# Patient Record
Sex: MALE | Race: WHITE | URBAN - METROPOLITAN AREA
[De-identification: names, ages, dates, MRNs, and addresses within clinical notes are randomized per-mention and may not be internally consistent; named-entity substitution may affect disease eponyms.]

---

## 2017-06-16 ENCOUNTER — TRANSFERRED RECORDS (OUTPATIENT)
Dept: HEALTH INFORMATION MANAGEMENT | Facility: CLINIC | Age: 22
End: 2017-06-16

## 2017-06-30 ENCOUNTER — TRANSFERRED RECORDS (OUTPATIENT)
Dept: HEALTH INFORMATION MANAGEMENT | Facility: CLINIC | Age: 22
End: 2017-06-30

## 2017-07-31 ENCOUNTER — ANESTHESIA (OUTPATIENT)
Dept: SURGERY | Facility: CLINIC | Age: 22
End: 2017-07-31
Payer: COMMERCIAL

## 2017-07-31 ENCOUNTER — HOSPITAL ENCOUNTER (OUTPATIENT)
Facility: CLINIC | Age: 22
Discharge: HOME | End: 2017-07-31
Attending: ORTHOPAEDIC SURGERY | Admitting: ORTHOPAEDIC SURGERY
Payer: COMMERCIAL

## 2017-07-31 ENCOUNTER — SURGERY (OUTPATIENT)
Age: 22
End: 2017-07-31

## 2017-07-31 ENCOUNTER — ANESTHESIA EVENT (OUTPATIENT)
Dept: SURGERY | Facility: CLINIC | Age: 22
End: 2017-07-31
Payer: COMMERCIAL

## 2017-07-31 ENCOUNTER — APPOINTMENT (OUTPATIENT)
Dept: GENERAL RADIOLOGY | Facility: CLINIC | Age: 22
End: 2017-07-31
Attending: ORTHOPAEDIC SURGERY
Payer: COMMERCIAL

## 2017-07-31 VITALS
OXYGEN SATURATION: 95 % | DIASTOLIC BLOOD PRESSURE: 78 MMHG | HEIGHT: 71 IN | SYSTOLIC BLOOD PRESSURE: 136 MMHG | BODY MASS INDEX: 40.18 KG/M2 | TEMPERATURE: 97.6 F | WEIGHT: 287 LBS | RESPIRATION RATE: 18 BRPM

## 2017-07-31 DIAGNOSIS — Z98.890 S/P DISCECTOMY: Primary | ICD-10-CM

## 2017-07-31 PROCEDURE — 71000013 ZZH RECOVERY PHASE 1 LEVEL 1 EA ADDTL HR: Performed by: ORTHOPAEDIC SURGERY

## 2017-07-31 PROCEDURE — 25000566 ZZH SEVOFLURANE, EA 15 MIN: Performed by: ORTHOPAEDIC SURGERY

## 2017-07-31 PROCEDURE — 25000128 H RX IP 250 OP 636: Performed by: ORTHOPAEDIC SURGERY

## 2017-07-31 PROCEDURE — 27210995 ZZH RX 272: Performed by: ORTHOPAEDIC SURGERY

## 2017-07-31 PROCEDURE — 25000132 ZZH RX MED GY IP 250 OP 250 PS 637: Performed by: ORTHOPAEDIC SURGERY

## 2017-07-31 PROCEDURE — 27210794 ZZH OR GENERAL SUPPLY STERILE: Performed by: ORTHOPAEDIC SURGERY

## 2017-07-31 PROCEDURE — 37000009 ZZH ANESTHESIA TECHNICAL FEE, EACH ADDTL 15 MIN: Performed by: ORTHOPAEDIC SURGERY

## 2017-07-31 PROCEDURE — 25000125 ZZHC RX 250

## 2017-07-31 PROCEDURE — 40000277 XR SURGERY CARM FLUORO LESS THAN 5 MIN W STILLS

## 2017-07-31 PROCEDURE — 25000125 ZZHC RX 250: Performed by: ORTHOPAEDIC SURGERY

## 2017-07-31 PROCEDURE — 25000128 H RX IP 250 OP 636

## 2017-07-31 PROCEDURE — 37000008 ZZH ANESTHESIA TECHNICAL FEE, 1ST 30 MIN: Performed by: ORTHOPAEDIC SURGERY

## 2017-07-31 PROCEDURE — 36000065 ZZH SURGERY LEVEL 4 W FLUORO 1ST 30 MIN: Performed by: ORTHOPAEDIC SURGERY

## 2017-07-31 PROCEDURE — 71000012 ZZH RECOVERY PHASE 1 LEVEL 1 FIRST HR: Performed by: ORTHOPAEDIC SURGERY

## 2017-07-31 PROCEDURE — 71000027 ZZH RECOVERY PHASE 2 EACH 15 MINS: Performed by: ORTHOPAEDIC SURGERY

## 2017-07-31 PROCEDURE — 25000128 H RX IP 250 OP 636: Performed by: ANESTHESIOLOGY

## 2017-07-31 PROCEDURE — 36000063 ZZH SURGERY LEVEL 4 EA 15 ADDTL MIN: Performed by: ORTHOPAEDIC SURGERY

## 2017-07-31 PROCEDURE — 40000170 ZZH STATISTIC PRE-PROCEDURE ASSESSMENT II: Performed by: ORTHOPAEDIC SURGERY

## 2017-07-31 RX ORDER — OXYCODONE HYDROCHLORIDE 5 MG/1
5 TABLET ORAL ONCE
Status: COMPLETED | OUTPATIENT
Start: 2017-07-31 | End: 2017-07-31

## 2017-07-31 RX ORDER — NEOSTIGMINE METHYLSULFATE 1 MG/ML
VIAL (ML) INJECTION PRN
Status: DISCONTINUED | OUTPATIENT
Start: 2017-07-31 | End: 2017-07-31

## 2017-07-31 RX ORDER — SODIUM CHLORIDE, SODIUM LACTATE, POTASSIUM CHLORIDE, CALCIUM CHLORIDE 600; 310; 30; 20 MG/100ML; MG/100ML; MG/100ML; MG/100ML
INJECTION, SOLUTION INTRAVENOUS CONTINUOUS
Status: DISCONTINUED | OUTPATIENT
Start: 2017-07-31 | End: 2017-07-31 | Stop reason: HOSPADM

## 2017-07-31 RX ORDER — ONDANSETRON 2 MG/ML
4 INJECTION INTRAMUSCULAR; INTRAVENOUS EVERY 30 MIN PRN
Status: DISCONTINUED | OUTPATIENT
Start: 2017-07-31 | End: 2017-07-31 | Stop reason: HOSPADM

## 2017-07-31 RX ORDER — SODIUM CHLORIDE, SODIUM LACTATE, POTASSIUM CHLORIDE, CALCIUM CHLORIDE 600; 310; 30; 20 MG/100ML; MG/100ML; MG/100ML; MG/100ML
INJECTION, SOLUTION INTRAVENOUS CONTINUOUS PRN
Status: DISCONTINUED | OUTPATIENT
Start: 2017-07-31 | End: 2017-07-31

## 2017-07-31 RX ORDER — ONDANSETRON 2 MG/ML
INJECTION INTRAMUSCULAR; INTRAVENOUS PRN
Status: DISCONTINUED | OUTPATIENT
Start: 2017-07-31 | End: 2017-07-31

## 2017-07-31 RX ORDER — CEFAZOLIN SODIUM 1 G/3ML
1 INJECTION, POWDER, FOR SOLUTION INTRAMUSCULAR; INTRAVENOUS SEE ADMIN INSTRUCTIONS
Status: DISCONTINUED | OUTPATIENT
Start: 2017-07-31 | End: 2017-07-31 | Stop reason: HOSPADM

## 2017-07-31 RX ORDER — KETOROLAC TROMETHAMINE 30 MG/ML
30 INJECTION, SOLUTION INTRAMUSCULAR; INTRAVENOUS ONCE
Status: COMPLETED | OUTPATIENT
Start: 2017-07-31 | End: 2017-07-31

## 2017-07-31 RX ORDER — CEFAZOLIN SODIUM 2 G/100ML
2 INJECTION, SOLUTION INTRAVENOUS
Status: DISCONTINUED | OUTPATIENT
Start: 2017-07-31 | End: 2017-07-31 | Stop reason: DRUGHIGH

## 2017-07-31 RX ORDER — CEFAZOLIN SODIUM 1 G/50ML
3 SOLUTION INTRAVENOUS
Status: COMPLETED | OUTPATIENT
Start: 2017-07-31 | End: 2017-07-31

## 2017-07-31 RX ORDER — FENTANYL CITRATE 0.05 MG/ML
25-50 INJECTION, SOLUTION INTRAMUSCULAR; INTRAVENOUS
Status: DISCONTINUED | OUTPATIENT
Start: 2017-07-31 | End: 2017-07-31 | Stop reason: HOSPADM

## 2017-07-31 RX ORDER — OXYCODONE HYDROCHLORIDE 5 MG/1
5 TABLET ORAL EVERY 4 HOURS PRN
Qty: 40 TABLET | Refills: 0 | Status: SHIPPED | OUTPATIENT
Start: 2017-07-31

## 2017-07-31 RX ORDER — DEXAMETHASONE SODIUM PHOSPHATE 4 MG/ML
INJECTION, SOLUTION INTRA-ARTICULAR; INTRALESIONAL; INTRAMUSCULAR; INTRAVENOUS; SOFT TISSUE PRN
Status: DISCONTINUED | OUTPATIENT
Start: 2017-07-31 | End: 2017-07-31

## 2017-07-31 RX ORDER — VECURONIUM BROMIDE 1 MG/ML
INJECTION, POWDER, LYOPHILIZED, FOR SOLUTION INTRAVENOUS PRN
Status: DISCONTINUED | OUTPATIENT
Start: 2017-07-31 | End: 2017-07-31

## 2017-07-31 RX ORDER — ONDANSETRON 4 MG/1
4 TABLET, ORALLY DISINTEGRATING ORAL EVERY 30 MIN PRN
Status: DISCONTINUED | OUTPATIENT
Start: 2017-07-31 | End: 2017-07-31 | Stop reason: HOSPADM

## 2017-07-31 RX ORDER — PROPOFOL 10 MG/ML
INJECTION, EMULSION INTRAVENOUS PRN
Status: DISCONTINUED | OUTPATIENT
Start: 2017-07-31 | End: 2017-07-31

## 2017-07-31 RX ORDER — PROPOFOL 10 MG/ML
INJECTION, EMULSION INTRAVENOUS CONTINUOUS PRN
Status: DISCONTINUED | OUTPATIENT
Start: 2017-07-31 | End: 2017-07-31

## 2017-07-31 RX ORDER — BETAMETHASONE SODIUM PHOSPHATE AND BETAMETHASONE ACETATE 3; 3 MG/ML; MG/ML
INJECTION, SUSPENSION INTRA-ARTICULAR; INTRALESIONAL; INTRAMUSCULAR; SOFT TISSUE PRN
Status: DISCONTINUED | OUTPATIENT
Start: 2017-07-31 | End: 2017-07-31 | Stop reason: HOSPADM

## 2017-07-31 RX ORDER — GLYCOPYRROLATE 0.2 MG/ML
INJECTION, SOLUTION INTRAMUSCULAR; INTRAVENOUS PRN
Status: DISCONTINUED | OUTPATIENT
Start: 2017-07-31 | End: 2017-07-31

## 2017-07-31 RX ORDER — LIDOCAINE HYDROCHLORIDE 20 MG/ML
INJECTION, SOLUTION INFILTRATION; PERINEURAL PRN
Status: DISCONTINUED | OUTPATIENT
Start: 2017-07-31 | End: 2017-07-31

## 2017-07-31 RX ORDER — FENTANYL CITRATE 50 UG/ML
INJECTION, SOLUTION INTRAMUSCULAR; INTRAVENOUS PRN
Status: DISCONTINUED | OUTPATIENT
Start: 2017-07-31 | End: 2017-07-31

## 2017-07-31 RX ORDER — MEPERIDINE HYDROCHLORIDE 25 MG/ML
12.5 INJECTION INTRAMUSCULAR; INTRAVENOUS; SUBCUTANEOUS EVERY 5 MIN PRN
Status: DISCONTINUED | OUTPATIENT
Start: 2017-07-31 | End: 2017-07-31 | Stop reason: HOSPADM

## 2017-07-31 RX ADMIN — DEXMEDETOMIDINE HYDROCHLORIDE 12 MCG: 100 INJECTION, SOLUTION INTRAVENOUS at 07:53

## 2017-07-31 RX ADMIN — FENTANYL CITRATE 50 MCG: 50 INJECTION, SOLUTION INTRAMUSCULAR; INTRAVENOUS at 11:29

## 2017-07-31 RX ADMIN — HYDROMORPHONE HYDROCHLORIDE 0.5 MG: 1 INJECTION, SOLUTION INTRAMUSCULAR; INTRAVENOUS; SUBCUTANEOUS at 11:38

## 2017-07-31 RX ADMIN — SODIUM CHLORIDE, POTASSIUM CHLORIDE, SODIUM LACTATE AND CALCIUM CHLORIDE: 600; 310; 30; 20 INJECTION, SOLUTION INTRAVENOUS at 09:46

## 2017-07-31 RX ADMIN — ONDANSETRON 4 MG: 2 INJECTION INTRAMUSCULAR; INTRAVENOUS at 09:20

## 2017-07-31 RX ADMIN — VECURONIUM BROMIDE 2 MG: 1 INJECTION, POWDER, LYOPHILIZED, FOR SOLUTION INTRAVENOUS at 08:00

## 2017-07-31 RX ADMIN — LIDOCAINE HYDROCHLORIDE 100 MG: 20 INJECTION, SOLUTION INFILTRATION; PERINEURAL at 07:35

## 2017-07-31 RX ADMIN — OXYCODONE HYDROCHLORIDE 5 MG: 5 TABLET ORAL at 12:10

## 2017-07-31 RX ADMIN — MIDAZOLAM HYDROCHLORIDE 2 MG: 1 INJECTION, SOLUTION INTRAMUSCULAR; INTRAVENOUS at 07:29

## 2017-07-31 RX ADMIN — BETAMETHASONE SODIUM PHOSPHATE AND BETAMETHASONE ACETATE 6 MG: 3; 3 INJECTION, SUSPENSION INTRA-ARTICULAR; INTRALESIONAL; INTRAMUSCULAR at 09:09

## 2017-07-31 RX ADMIN — DEXAMETHASONE SODIUM PHOSPHATE 4 MG: 4 INJECTION, SOLUTION INTRA-ARTICULAR; INTRALESIONAL; INTRAMUSCULAR; INTRAVENOUS; SOFT TISSUE at 07:55

## 2017-07-31 RX ADMIN — BUPIVACAINE HYDROCHLORIDE AND EPINEPHRINE BITARTRATE 59 ML: 5; .005 INJECTION, SOLUTION PERINEURAL at 09:04

## 2017-07-31 RX ADMIN — NEOSTIGMINE METHYLSULFATE 5 MG: 1 INJECTION INTRAMUSCULAR; INTRAVENOUS; SUBCUTANEOUS at 09:30

## 2017-07-31 RX ADMIN — PROCHLORPERAZINE EDISYLATE 5 MG: 5 INJECTION INTRAMUSCULAR; INTRAVENOUS at 14:06

## 2017-07-31 RX ADMIN — SODIUM CHLORIDE, POTASSIUM CHLORIDE, SODIUM LACTATE AND CALCIUM CHLORIDE: 600; 310; 30; 20 INJECTION, SOLUTION INTRAVENOUS at 14:04

## 2017-07-31 RX ADMIN — PROPOFOL 200 MCG/KG/MIN: 10 INJECTION, EMULSION INTRAVENOUS at 07:45

## 2017-07-31 RX ADMIN — ONDANSETRON 4 MG: 2 INJECTION INTRAMUSCULAR; INTRAVENOUS at 12:51

## 2017-07-31 RX ADMIN — VECURONIUM BROMIDE 1 MG: 1 INJECTION, POWDER, LYOPHILIZED, FOR SOLUTION INTRAVENOUS at 09:01

## 2017-07-31 RX ADMIN — GLYCOPYRROLATE 0.8 MG: 0.2 INJECTION, SOLUTION INTRAMUSCULAR; INTRAVENOUS at 09:30

## 2017-07-31 RX ADMIN — PROPOFOL 30 MG: 10 INJECTION, EMULSION INTRAVENOUS at 07:36

## 2017-07-31 RX ADMIN — FENTANYL CITRATE 100 MCG: 50 INJECTION, SOLUTION INTRAMUSCULAR; INTRAVENOUS at 07:35

## 2017-07-31 RX ADMIN — GENTAMICIN SULFATE 1000 ML: 40 INJECTION, SOLUTION INTRAMUSCULAR; INTRAVENOUS at 08:14

## 2017-07-31 RX ADMIN — FENTANYL CITRATE 50 MCG: 50 INJECTION, SOLUTION INTRAMUSCULAR; INTRAVENOUS at 11:11

## 2017-07-31 RX ADMIN — MIDAZOLAM HYDROCHLORIDE 1 MG: 1 INJECTION, SOLUTION INTRAMUSCULAR; INTRAVENOUS at 07:35

## 2017-07-31 RX ADMIN — SODIUM CHLORIDE, POTASSIUM CHLORIDE, SODIUM LACTATE AND CALCIUM CHLORIDE: 600; 310; 30; 20 INJECTION, SOLUTION INTRAVENOUS at 07:28

## 2017-07-31 RX ADMIN — Medication 2 G: at 07:45

## 2017-07-31 RX ADMIN — PROPOFOL 200 MG: 10 INJECTION, EMULSION INTRAVENOUS at 07:35

## 2017-07-31 RX ADMIN — THROMBIN, TOPICAL (BOVINE) 5000 UNITS: KIT at 08:14

## 2017-07-31 RX ADMIN — DEXMEDETOMIDINE HYDROCHLORIDE 8 MCG: 100 INJECTION, SOLUTION INTRAVENOUS at 09:09

## 2017-07-31 RX ADMIN — KETOROLAC TROMETHAMINE 30 MG: 30 INJECTION, SOLUTION INTRAMUSCULAR at 10:37

## 2017-07-31 RX ADMIN — ROCURONIUM BROMIDE 50 MG: 10 INJECTION INTRAVENOUS at 07:35

## 2017-07-31 RX ADMIN — VECURONIUM BROMIDE 1 MG: 1 INJECTION, POWDER, LYOPHILIZED, FOR SOLUTION INTRAVENOUS at 08:30

## 2017-07-31 RX ADMIN — FENTANYL CITRATE 50 MCG: 50 INJECTION, SOLUTION INTRAMUSCULAR; INTRAVENOUS at 08:03

## 2017-07-31 NOTE — OR NURSING
Pt had reported some tingling in left leg while in PACU.  Pt states this was present pre op as well.   Upon getting pt up to progress to phase II.  Pt says he has some numbness in left leg.  Call placed to Dr. Blake.  Dr. Blake in surgery, will see pt when he comes out.

## 2017-07-31 NOTE — ANESTHESIA CARE TRANSFER NOTE
Patient: Fran Hernandez    Procedure(s):  LEFT L4-5 DISCECTOMY - Wound Class: I-Clean    Diagnosis: marge central l4-5 disc herneation, left leg radiculopathy, status post previous l4-s1 discectomy   Diagnosis Additional Information: No value filed.    Anesthesia Type:   General, ETT     Note:  Airway :Face Mask  Patient transferred to:PACU  Comments: Pt awake and able to verbalize needs. ALL monitors on and audible. Vital signs stable. Spontaneous respiration without difficulty. o2 sats 100% on 10Lo2 per face mask. Pt denies pain. Report given to PACU RN.      Vitals: (Last set prior to Anesthesia Care Transfer)    CRNA VITALS  7/31/2017 0933 - 7/31/2017 1015      7/31/2017             NIBP: 148/76    Pulse: 80                Electronically Signed By: SAMMY Schmitz CRNA  July 31, 2017  10:15 AM

## 2017-07-31 NOTE — IP AVS SNAPSHOT
MRN:[id number]                      After Visit Summary   7/31/2017    Fran Hernandez    MRN: [id number]           Thank you!     Thank you for choosing [facility] for your care. Our goal is always to provide you with excellent care. Hearing back from our patients is one way we can continue to improve our services. Please take a few minutes to complete the written survey that you may receive in the mail after you visit with us. Thank you!        Patient Information     Date Of Birth          1995        About your hospital stay     You were admitted on:  July 31, 2017 You last received care in the:  [facility] PACU    You were discharged on:  July 31, 2017       Who to Call     For medical emergencies, please call 911.  For non-urgent questions about your medical care, please call your primary care provider or clinic, None  For questions related to your surgery, please call your surgery clinic        Attending Provider     Provider Lex Ramirez MD Orthopedics       Primary Care Provider    None      After Care Instructions     Activity       Your activity upon discharge: follow Dr. Blake d/c instruction sheet            Discharge Instructions           Supplies       List the supplies the pt needs to go home  4x4s and tape                  Further instructions from your care team         While you were at the hospital today you received Toradol, an antiinflammatory medication similar to Ibuprofen.  You should not take other antiinflammatory medication, such as Ibuprofen, Motrin, Advil, Aleve, Naprosyn, etc, until 440pm.       Same Day Surgery Discharge Instructions for  Sedation and General Anesthesia       It's not unusual to feel dizzy, light-headed or faint for up to 24 hours after surgery or while taking pain medication.  If you have these symptoms: sit for a few minutes before standing and have someone assist you when you get up to walk or use the  bathroom.      You should rest and relax for the next 24 hours. We recommend you make arrangements to have an adult stay with you for at least 24 hours after your discharge.  Avoid hazardous and strenuous activity.      DO NOT DRIVE any vehicle or operate mechanical equipment for 24 hours following the end of your surgery.  Even though you may feel normal, your reactions may be affected by the medication you have received.      Do not drink alcoholic beverages for 24 hours following surgery.       Slowly progress to your regular diet as you feel able. It's not unusual to feel nauseated and/or vomit after receiving anesthesia.  If you develop these symptoms, drink clear liquids (apple juice, ginger ale, broth, 7-up, etc. ) until you feel better.  If your nausea and vomiting persists for 24 hours, please notify your surgeon.        All narcotic pain medications, along with inactivity and anesthesia, can cause constipation. Drinking plenty of liquids and increasing fiber intake will help.      For any questions of a medical nature, call your surgeon.      Do not make important decisions for 24 hours.      If you had general anesthesia, you may have a sore throat for a couple of days related to the breathing tube used during surgery.  You may use Cepacol lozenges to help with this discomfort.  If it worsens or if you develop a fever, contact your surgeon.       If you feel your pain is not well managed with the pain medications prescribed by your surgeon, please contact your surgeon's office to let them know so they can address your concerns.       [facility]   Post-Operative Laminectomy/Discectomy Instructions  Dr. Lex Blake    As you are getting ready to leave the hospital following your surgery, you will have many questions regarding your follow-up and after hospital care.  Dr. Blake will talk to you about many of these questions in the hospital but it is also important for you to have your  instructions written down so that you can refer to them when you get home.  These are general guidelines that apply to patients that had your type of surgery.      If you were not given a post-operative follow up appointment then please call Dr. Blake's office at  [phone] for a follow-up appointment for 10-14 days after the date of your surgery.      After surgery you may notice some continued leg or back pain similar to the symptoms you had prior to surgery.  This is normal and usually is related to the amount of pressure and the length of time that the nerves were pinched.  Sometimes the pain that you felt in your leg is replaced with some degree of numbness that gradually fades over a period of days or weeks.      Certain postures and activities can cause an increase in back or leg pain and may even put you at risk for recurrence.  Avoid any twisting or bending.  Do not lift anything that weighs more than 15 pounds.  When picking things up from the floor, be sure to bend at the knees.  When moving about, it is best to try to keep your back straight and well supported.      Try to restrict your sitting to a minimum for the first 1-2 days for periods less than 20-30 min at a time as sitting places an increased load on the intervertebral discs. Gradually increase sitting, as comfort allows over the first week post-op.  When you do sit, try to use a chair that provides adequate support for your back.  When riding in a car, you may want to recline the seat to lessen the load on the intervertebral disc.      Walking is an excellent post-operative exercise.  You are encouraged to walk several times per day for at least five to ten minutes at a time.  Walking for longer periods is all right if you do not develop a pain in either your back or legs.  Let your comfort be your guide in helping you set limits.  Do not participate in any sports activities.      You may shower on the third day after your surgery, if your  incision is clean and dry.  You may shower earlier if you have a plastic dressing (Tegaderm) to cover your incision.  Do not soak in a tub until you are seen in the office for your follow-up appointment with Dr. Blake.      The small pieces of tape over your incision are called Steri-Strips. They can be removed if they become lose; other-wise leave steri-strips on for 14 days.  Your stitches are the kind that dissolves under the skin.  They do not have to be removed.      Occasionally, non-absorbable sutures (black nylon) need to be used.  This is not covered by Steri-Strips.  These sutures should be covered with a waterproof barrier when showering (Tegaderm or foam tape), and will be removed in the office at your follow-up appointment.      If you go home the day of surgery, you should put on a fresh dressing the following day.  If you stay overnight in the hospital, the nurse will check your incision and change the dressing before you are discharged.  Change your dressing daily for 10-14 days.        If you had compression stockings (TEDS) on in the hospital you do not need to wear these after you are discharged from the hospital.      You should not drive until you are no longer taking narcotic pain medication.      After your surgery, you may begin to engage in sexual activity as comfort allows.  For the first four weeks it is recommended that you participate as a passive partner.      Before returning to work, please consult Dr. Blake regarding work limitations or restrictions. Your ability to return to work will depend on the type of work that you do and the type of surgery you had.  Please let Dr. Blake know if you need any written information for your employer.      Infection following disc surgery is rare.  Signs of infection include: drainage from your incision, increasing redness at the margins of your wound, fevers (greater than 101   Fahrenheit), chills, rapidly increasing back pain.  If you have any of  "these symptoms, please call the office and either Dr. Blake or one of the nurses can discuss this with you.  Other reasons to call the office would include difficulty passing your urine or trouble controlling your bowels.      If you have any questions or concerns not covered in these instructions, please feel free to call Dr. Blake's office at [phone].  Someone is always available to answer your questions.                     Pending Results     Date and Time Order Name Status Description    2017 0951 XR Surgery SHELIA Fluoro L/T 5 Min w Stills In process             Admission Information     Date & Time Provider Department Dept. Phone    2017 Lex Blake MD [facility] PACU [phone]      Your Vitals Were     Blood Pressure Temperature Respirations Height Weight Pulse Oximetry    162/86 97.6  F (36.4  C) (Temporal) 16 1.791 m (5' 10.5\") 130.2 kg (287 lb) 93%    BMI (Body Mass Index)                   40.6 kg/m2           [employer] Information     [email or web address] lets you send messages to your doctor, view your test results, renew your prescriptions, schedule appointments and more. To sign up, go to [email or web address] . Click on \"Log in\" on the left side of the screen, which will take you to the Welcome page. Then click on \"Sign up Now\" on the right side of the page.     You will be asked to enter the access code listed below, as well as some personal information. Please follow the directions to create your username and password.     Your access code is: [email or web address]  Expires: 10/29/2017 11:16 AM     Your access code will  in 90 days. If you need help or a new code, please call your [place] clinic or [phone].        Care EveryWhere ID     This is your Care EveryWhere ID. This could be used by other organizations to access your [place] medical records  [id number]        Equal Access to Services     KM ORTIZ AH: Lenny Mar, junaid layton, melissa " sunita mackenzie avilagavino ana daveyaan ah. [facility] [phone].    ATENCIÓN: Si sai butts, tiene a ventura disposición servicios gratuitos de asistencia lingüística. Llchel[place] al [phone].    We comply with applicable federal civil rights laws and [place] laws. We do not discriminate on the basis of race, color, national origin, age, disability sex, sexual orientation or gender identity.               Review of your medicines      START taking        Dose / Directions    oxyCODONE 5 MG IR tablet   Commonly known as:  ROXICODONE   Used for:  S/P discectomy   Notes to Patient:  One tab taken at 12:10        Dose:  5 mg   Take 1 tablet (5 mg) by mouth every 4 hours as needed for pain   Quantity:  40 tablet   Refills:  0         CONTINUE these medicines which have NOT CHANGED        Dose / Directions    SERTRALINE HCL PO        Dose:  50 mg   Take 50 mg by mouth daily   Refills:  0       TYLENOL PO        Dose:  325 mg   Take 325 mg by mouth every 6 hours as needed for mild pain or fever   Refills:  0            Where to get your medicines      Some of these will need a paper prescription and others can be bought over the counter. Ask your nurse if you have questions.     Bring a paper prescription for each of these medications     oxyCODONE 5 MG IR tablet                Protect others around you: Learn how to safely use, store and throw away your medicines at www.disposemymeds.org.             Medication List: This is a list of all your medications and when to take them. Check marks below indicate your daily home schedule. Keep this list as a reference.      Medications           Morning Afternoon Evening Bedtime As Needed    oxyCODONE 5 MG IR tablet   Commonly known as:  ROXICODONE   Take 1 tablet (5 mg) by mouth every 4 hours as needed for pain   Last time this was given:  5 mg on 7/31/2017 12:10 PM   Notes to Patient:  One tab taken at 12:10                                SERTRALINE HCL PO    Take 50 mg by mouth daily                                TYLENOL PO   Take 325 mg by mouth every 6 hours as needed for mild pain or fever

## 2017-07-31 NOTE — IP AVS SNAPSHOT
[place] [address]    [place] [address]    Phone:  [phone]                                       After Visit Summary   7/31/2017    Fran Hernandez    MRN: [id number]           After Visit Summary Signature Page     I have received my discharge instructions, and my questions have been answered. I have discussed any challenges I see with this plan with the nurse or doctor.    ..........................................................................................................................................  Patient/Patient Representative Signature      ..........................................................................................................................................  Patient Representative Print Name and Relationship to Patient    ..................................................               ................................................  Date                                            Time    ..........................................................................................................................................  Reviewed by Signature/Title    ...................................................              ..............................................  Date                                                            Time

## 2017-07-31 NOTE — ANESTHESIA POSTPROCEDURE EVALUATION
Patient: Fran David    Procedure(s):  LEFT L4-5 DISCECTOMY - Wound Class: I-Clean    Diagnosis:marge central l4-5 disc herneation, left leg radiculopathy, status post previous l4-s1 discectomy   Diagnosis Additional Information: No value filed.    Anesthesia Type:  General, ETT    Note:  Anesthesia Post Evaluation    Patient location during evaluation: bedside  Patient participation: Able to fully participate in evaluation  Level of consciousness: awake  Pain management: adequate  Airway patency: patent  Cardiovascular status: acceptable  Respiratory status: acceptable  Hydration status: acceptable  PONV: none     Anesthetic complications: None    Comments: No anesthetic complications noted.         Last vitals:  Vitals:    07/31/17 1150 07/31/17 1200 07/31/17 1215   BP:  162/86 173/84   Resp: 11 16 19   Temp:      SpO2: 95% 93% 94%         Electronically Signed By: Mark Mcdonough DO, DO  July 31, 2017  1:18 PM

## 2017-07-31 NOTE — OP NOTE
[facility]  Spinal Surgery Operative Report    Pre-operative diagnosis:  Large left and central L4 5 disc herniation with left leg radiculopathy    Post-operative diagnosis: Same   Procedure:  left L4 5 discectomy   Surgeon: VERONICA HODGE MD   Assistant(s): JACOBY FIGUEROA PA-C   Anesthesia: General endotracheal anesthesia   Estimated blood loss: 20 ml   Total IV fluids: (See anesthesia record)   Blood transfusion: NONE   Drains: None   Specimens: NONE   Implants: AS ABOVE   Findings: AS ABOVE   Complications: NONE   Condition: STABLE       Operative Description:     The patient was brought to the operating room.  General anesthetic was administered.   He was positioned prone on the Blevins frame.  He was carefully padded and positioned, his back was marked with a marking pen over the planned skin incision.  He had had a previous L5-S1 discectomy.  That old scar was marked as well.  His back was then prepped and draped in a routine sterile fashion.    A marking needle was placed at what was felt to be the L4 5 interspace.  A lateral x-ray was obtained.  This confirmed that it was the L4 5 interspace.  The planned skin incision was then injected with 10 cc of 0.5% Marcaine with epinephrine.  After the timeout midline skin incision was made over the L4 5 interspace.  A left-sided exposure was performed.  A clamp was placed on the spinous process of L4.  Lateral x-ray was obtained.  This confirmed that it was the spinous process of L4.  This was marked with a rongeur.  The left L4 5 interspace was exposed.  A Walton retractor was placed.  A partial spinous process removal of L4 was required to get access to the L4 5 interspace.  Ligamentum flavum was removed.  A laminotomy of L4 was required as well as L5.  Head and narrow interlaminar space and the narrow interfacet distance.    I was able to get the traversing L5 nerve root exposed.  Because of the size of the disc herniation it was a little difficult to  mobilize the nerve initially.  He had some epidural veins that were also bleeding and work coagulated with bipolar cautery.  I was unable to mobilize the nerve medially.  My assistant held the nerve retracted with G Rico retractor.  As I mobilized the nerve medially I could see the thin inflammatory covering.  As was entered with a curette.  Disc material extruded under pressure.  I then used straight and up-biting [place] pituitaries to remove the disc material.  Eventually I used a regular sized pituitary as well.  He had a large hole in the annulus.  The disc material itself was soft.  When I could not retrieve any additional material the wound was irrigated with antibiotic solution.  Additional Marcaine was used in the skin fascia and muscle.    The disc was checked one final time.  There were no additional loose fragments.  Celestone and Gelfoam placed over the nerve root.  The wound was closed in routine fashion with interrupted #1 Vicryl suture for the deep fascia, 2-0 Vicryl for the subcutaneous and 3-0 Vicryl for the skin.    Dressings were applied, drapes removed, he was transferred back to the hospital cart, taken recovery room in good condition.  Estimated blood loss was 20 cc, complications none, drains none

## 2017-07-31 NOTE — DISCHARGE INSTRUCTIONS
While you were at the hospital today you received Toradol, an antiinflammatory medication similar to Ibuprofen.  You should not take other antiinflammatory medication, such as Ibuprofen, Motrin, Advil, Aleve, Naprosyn, etc, until 440pm.       Same Day Surgery Discharge Instructions for  Sedation and General Anesthesia       It's not unusual to feel dizzy, light-headed or faint for up to 24 hours after surgery or while taking pain medication.  If you have these symptoms: sit for a few minutes before standing and have someone assist you when you get up to walk or use the bathroom.      You should rest and relax for the next 24 hours. We recommend you make arrangements to have an adult stay with you for at least 24 hours after your discharge.  Avoid hazardous and strenuous activity.      DO NOT DRIVE any vehicle or operate mechanical equipment for 24 hours following the end of your surgery.  Even though you may feel normal, your reactions may be affected by the medication you have received.      Do not drink alcoholic beverages for 24 hours following surgery.       Slowly progress to your regular diet as you feel able. It's not unusual to feel nauseated and/or vomit after receiving anesthesia.  If you develop these symptoms, drink clear liquids (apple juice, ginger ale, broth, 7-up, etc. ) until you feel better.  If your nausea and vomiting persists for 24 hours, please notify your surgeon.        All narcotic pain medications, along with inactivity and anesthesia, can cause constipation. Drinking plenty of liquids and increasing fiber intake will help.      For any questions of a medical nature, call your surgeon.      Do not make important decisions for 24 hours.      If you had general anesthesia, you may have a sore throat for a couple of days related to the breathing tube used during surgery.  You may use Cepacol lozenges to help with this discomfort.  If it worsens or if you develop a fever, contact your  surgeon.       If you feel your pain is not well managed with the pain medications prescribed by your surgeon, please contact your surgeon's office to let them know so they can address your concerns.       [facility]   Post-Operative Laminectomy/Discectomy Instructions  Dr. Lex Blake    As you are getting ready to leave the hospital following your surgery, you will have many questions regarding your follow-up and after hospital care.  Dr. Blake will talk to you about many of these questions in the hospital but it is also important for you to have your instructions written down so that you can refer to them when you get home.  These are general guidelines that apply to patients that had your type of surgery.      If you were not given a post-operative follow up appointment then please call Dr. Blake's office at  [phone] for a follow-up appointment for 10-14 days after the date of your surgery.      After surgery you may notice some continued leg or back pain similar to the symptoms you had prior to surgery.  This is normal and usually is related to the amount of pressure and the length of time that the nerves were pinched.  Sometimes the pain that you felt in your leg is replaced with some degree of numbness that gradually fades over a period of days or weeks.      Certain postures and activities can cause an increase in back or leg pain and may even put you at risk for recurrence.  Avoid any twisting or bending.  Do not lift anything that weighs more than 15 pounds.  When picking things up from the floor, be sure to bend at the knees.  When moving about, it is best to try to keep your back straight and well supported.      Try to restrict your sitting to a minimum for the first 1-2 days for periods less than 20-30 min at a time as sitting places an increased load on the intervertebral discs. Gradually increase sitting, as comfort allows over the first week post-op.  When you do sit, try  to use a chair that provides adequate support for your back.  When riding in a car, you may want to recline the seat to lessen the load on the intervertebral disc.      Walking is an excellent post-operative exercise.  You are encouraged to walk several times per day for at least five to ten minutes at a time.  Walking for longer periods is all right if you do not develop a pain in either your back or legs.  Let your comfort be your guide in helping you set limits.  Do not participate in any sports activities.      You may shower on the third day after your surgery, if your incision is clean and dry.  You may shower earlier if you have a plastic dressing (Tegaderm) to cover your incision.  Do not soak in a tub until you are seen in the office for your follow-up appointment with Dr. Blake.      The small pieces of tape over your incision are called Steri-Strips. They can be removed if they become lose; other-wise leave steri-strips on for 14 days.  Your stitches are the kind that dissolves under the skin.  They do not have to be removed.      Occasionally, non-absorbable sutures (black nylon) need to be used.  This is not covered by Steri-Strips.  These sutures should be covered with a waterproof barrier when showering (Tegaderm or foam tape), and will be removed in the office at your follow-up appointment.      If you go home the day of surgery, you should put on a fresh dressing the following day.  If you stay overnight in the hospital, the nurse will check your incision and change the dressing before you are discharged.  Change your dressing daily for 10-14 days.        If you had compression stockings (TEDS) on in the hospital you do not need to wear these after you are discharged from the hospital.      You should not drive until you are no longer taking narcotic pain medication.      After your surgery, you may begin to engage in sexual activity as comfort allows.  For the first four weeks it is recommended  that you participate as a passive partner.      Before returning to work, please consult Dr. Blake regarding work limitations or restrictions. Your ability to return to work will depend on the type of work that you do and the type of surgery you had.  Please let Dr. Blake know if you need any written information for your employer.      Infection following disc surgery is rare.  Signs of infection include: drainage from your incision, increasing redness at the margins of your wound, fevers (greater than 101   Fahrenheit), chills, rapidly increasing back pain.  If you have any of these symptoms, please call the office and either Dr. Blake or one of the nurses can discuss this with you.  Other reasons to call the office would include difficulty passing your urine or trouble controlling your bowels.      If you have any questions or concerns not covered in these instructions, please feel free to call Dr. Blake's office at [phone].  Someone is always available to answer your questions.

## 2017-07-31 NOTE — ANESTHESIA PREPROCEDURE EVALUATION
Anesthesia Evaluation     .             ROS/MED HX    ENT/Pulmonary:  - neg pulmonary ROS    (-) sleep apnea   Neurologic:     (+)other neuro L4-L5 disc herniation with radiculopathy    Cardiovascular:         METS/Exercise Tolerance:     Hematologic:  - neg hematologic  ROS       Musculoskeletal:  - neg musculoskeletal ROS       GI/Hepatic:  - neg GI/hepatic ROS      (-) GERD   Renal/Genitourinary:  - ROS Renal section negative       Endo:  - neg endo ROS       Psychiatric:     (+) psychiatric history anxiety and depression      Infectious Disease:  - neg infectious disease ROS       Malignancy:      - no malignancy   Other:                     Physical Exam  Normal systems: cardiovascular, pulmonary and dental    Airway   Mallampati: II  TM distance: >3 FB  Neck ROM: full    Dental     Cardiovascular       Pulmonary                     Anesthesia Plan      History & Physical Review  History and physical reviewed and following examination; no interval change.    ASA Status:  2 .    NPO Status:  > 8 hours    Plan for General and ETT with Intravenous induction. Maintenance will be Balanced.    PONV prophylaxis:  Ondansetron (or other 5HT-3) and Dexamethasone or Solumedrol  Additional equipment: Videolaryngoscope      Postoperative Care  Postoperative pain management:  IV analgesics.      Consents  Anesthetic plan, risks, benefits and alternatives discussed with:  Patient..            Procedure: Procedure(s):  LAMINECTOMY LUMBAR ONE LEVEL  Preop diagnosis: caroline central l4-5 disc herneation, left leg radiculopathy, status post previous l4-s1 discectomy     No Known Allergies  Past Medical History:   Diagnosis Date     Anxiety      Depression      Lumbar radiculopathy      Past Surgical History:   Procedure Laterality Date     BACK SURGERY       LUMBAR BACK SURGERY       Prior to Admission medications    Medication Sig Start Date End Date Taking? Authorizing Provider   SERTRALINE HCL PO Take 50 mg by mouth daily    Yes Reported, Patient     Current Facility-Administered Medications Ordered in Epic   Medication Dose Route Frequency Last Rate Last Dose     ceFAZolin (ANCEF) 1 g vial to attach to  ml bag for ADULT or 50 ml bag for PEDS  1 g Intravenous See Admin Instructions         ceFAZolin (ANCEF) intermittent infusion 3 g (pre-mix)  3 g Intravenous Pre-Op/Pre-procedure x 1 dose         No current [facility]-ordered outpatient prescriptions on file.     Wt Readings from Last 1 Encounters:   07/31/17 130.2 kg (287 lb)     Temp Readings from Last 1 Encounters:   07/31/17 36.9  C (98.4  F) (Tympanic)     BP Readings from Last 6 Encounters:   No data found for BP     Pulse Readings from Last 4 Encounters:   No data found for Pulse     Resp Readings from Last 1 Encounters:   No data found for Resp     SpO2 Readings from Last 1 Encounters:   No data found for SpO2     No results for input(s): NA, POTASSIUM, CHLORIDE, CO2, ANIONGAP, GLC, BUN, CR, JAROD in the last 64359 hours.  No results for input(s): WBC, HGB, PLT in the last 25498 hours.  No results for input(s): INR in the last 81440 hours.    Invalid input(s): APTT   RECENT LABS:   ECG:   ECHO:   CXR:                  .

## 2017-07-31 NOTE — OR NURSING
States that nausea improved after Compazine. Patient assisted to sitting, then upright. Transferred into wheelchair without difficulty. Brought to discharge door, discharged to home.

## 2017-08-18 ENCOUNTER — TRANSFERRED RECORDS (OUTPATIENT)
Dept: HEALTH INFORMATION MANAGEMENT | Facility: CLINIC | Age: 22
End: 2017-08-18

## 2017-08-20 ENCOUNTER — HOSPITAL ENCOUNTER (INPATIENT)
Facility: CLINIC | Age: 22
LOS: 1 days | Discharge: HOME | DRG: 520 | End: 2017-08-22
Attending: ORTHOPAEDIC SURGERY | Admitting: ORTHOPAEDIC SURGERY
Payer: COMMERCIAL

## 2017-08-20 ENCOUNTER — TRANSFERRED RECORDS (OUTPATIENT)
Dept: HEALTH INFORMATION MANAGEMENT | Facility: CLINIC | Age: 22
End: 2017-08-20

## 2017-08-20 ENCOUNTER — HOSPITAL ENCOUNTER (OUTPATIENT)
Facility: CLINIC | Age: 22
End: 2017-08-20
Attending: ORTHOPAEDIC SURGERY | Admitting: ORTHOPAEDIC SURGERY
Payer: COMMERCIAL

## 2017-08-20 DIAGNOSIS — Z98.890 STATUS POST LUMBAR DISCECTOMY: Primary | ICD-10-CM

## 2017-08-20 PROBLEM — M51.26 LUMBAR DISC HERNIATION: Status: ACTIVE | Noted: 2017-08-20

## 2017-08-20 PROCEDURE — G0378 HOSPITAL OBSERVATION PER HR: HCPCS

## 2017-08-20 RX ORDER — NALOXONE HYDROCHLORIDE 0.4 MG/ML
.1-.4 INJECTION, SOLUTION INTRAMUSCULAR; INTRAVENOUS; SUBCUTANEOUS
Status: DISCONTINUED | OUTPATIENT
Start: 2017-08-20 | End: 2017-08-21

## 2017-08-20 RX ORDER — CEFAZOLIN SODIUM 1 G/3ML
1 INJECTION, POWDER, FOR SOLUTION INTRAMUSCULAR; INTRAVENOUS SEE ADMIN INSTRUCTIONS
Status: DISCONTINUED | OUTPATIENT
Start: 2017-08-20 | End: 2017-08-21 | Stop reason: HOSPADM

## 2017-08-20 RX ORDER — CEFAZOLIN SODIUM 1 G/50ML
3 SOLUTION INTRAVENOUS
Status: COMPLETED | OUTPATIENT
Start: 2017-08-20 | End: 2017-08-21

## 2017-08-20 RX ORDER — DIAZEPAM 5 MG
5 TABLET ORAL EVERY 6 HOURS PRN
Status: DISCONTINUED | OUTPATIENT
Start: 2017-08-20 | End: 2017-08-21 | Stop reason: HOSPADM

## 2017-08-20 NOTE — IP AVS SNAPSHOT
MRN:[id number]                      After Visit Summary   8/20/2017    Fran Hernandez    MRN: [id number]           Thank you!     Thank you for choosing [facility] for your care. Our goal is always to provide you with excellent care. Hearing back from our patients is one way we can continue to improve our services. Please take a few minutes to complete the written survey that you may receive in the mail after you visit with us. Thank you!        Patient Information     Date Of Birth          1995        Designated Caregiver       Most Recent Value    Caregiver    Will someone help with your care after discharge? yes      About your hospital stay     You were admitted on:  August 20, 2017 You last received care in the:  [facility] [address] [facility]    You were discharged on:  August 22, 2017       Who to Call     For medical emergencies, please call 911.  For non-urgent questions about your medical care, please call your primary care provider or clinic, None  For questions related to your surgery, please call your surgery clinic        Attending Provider     Provider Lex Ramirez MD Orthopedics       Primary Care Provider    None      Follow-up Appointments     Follow-up and recommended labs and tests        Give patient discharge instruction sheet, dressing supplies.  Patient has sutures that need to be removed.  He needs to follow up with me in two weeks for suture removal.  He needs to keep his incision clean and dry.  He should cover the incision with 4 x 4 and Tegaderm for showering.                  Further instructions from your care team                 Care after a Discectomy/Decompression - Dr. Lex Blake    The following information will help you through your recovery at home.  Pain    It is normal for you to experience some pain in the area of your incision after your surgery.  Some of your leg pain may go away immediately after your surgery.  Some of  the pain will gradually decrease over the next few days or weeks depending on the amount of inflammation present in the nerve.      Sometimes Dr. Blake will place a steroid preparation on the nerve during surgery.   This may help decrease the nerve inflammation for the first few days after surgery.  If some of your leg pain returns 3 to 5 days after surgery it may be due to the steroid wearing off.  The pain should not be as bad as your pre-op pain and will diminish over a period of weeks.      You should call Dr. Blake s office if your leg pain returns suddenly and does not improve over 24 hours.    Activity    You may increase your activity as tolerated; walking is the best form of exercise after spine surgery.      Avoid bending, lifting, and twisting (BLT).  Avoid activities such as vacuuming, raking and shoveling.    Try to limit your lifting to 10-15lbs during the first 2 weeks and then increase to 20-25lbs over the next 6 weeks.    You may return to work approximately 1- 2 weeks after your surgery, if you have a sedentary or desk type job.  If you have a physical job Dr. Blake and his staff will help you determine a return to work plan.      You may resume sexual activity when you feel ready.  Stop if you have pain.    Driving    You may drive if you feel strong enough and are not taking any narcotic pain medications.    Incision Site     The first 3 days after surgery Dr. Blake would like you to keep your incision dry.   You may take a sponge bath during this time or cover your dressing with a transparent material called Tegaderm (sold at most pharmacies).      The first 3 days after surgery you should change your gauze dressing at least once a day.  After 3 days you may remove the gauze dressing and leave it off, at this point you may shower without covering your incision, allow water and soap to run over your incision but do not scrub the area or soak in a tub.    Your incision is covered with steri-strips  (narrow white tapes); they will get loose and fall of in 7-10 days.  If they do not fall off after 10 days you may remove them or Dr. Hayden pérez staff will remove them at your post-op visit.    Most patients have dissolvable stitches which do not need to be removed.  In some cases nylon stitches are used and they need to be removed, 10-14 days after surgery.  If you have staples or nylon sutures please call for a removal appointment with Dr. Hayden pérez nurse.    Pain Management     Take your prescribed pain medication as needed and directed.  Use Tylenol and Ibuprofen for your discomfort when you no longer need the narcotic pain medication.      If you need a refill on your pain medication call [phone], please allow 24 hours for your prescription to be refilled, Dr. Hayden pérez office does not refill pain medications on Friday.   Diet     Eat a healthy diet; this will help your recovery.    Drink plenty of fluids, water, milk or juice.    If you have trouble with constipation you should eat more fiber, drink more fluids, increase your walking or try an over the counter laxative.    Follow-up Visits    You should have a post-op appointment that was scheduled for you at the same time your surgery was scheduled. If you do not, please call Dr. Hayden pérez office when you get home from your surgery.    Write down any questions you have about your surgery, recovery, return to work and other topics you wished to be covered at your post-op visit.  This way, we will be able to address all of your questions at your next visit.    Call Dr. Hayden pérez office if you have any questions or concerns.    When to Call your Doctor:    If you have any redness, warmth or swelling at the incision site.    If your incision opens up.    If you have increasing drainage from your incision.    If you have a temperature greater than 100.5 degrees Fahrenheit.    [facility] [address] [address]  Phone: [phone]  Fax: [phone]  Web site:  [email or web address]    Follow up with Dr. Blake in 2-3 weeks.     Pending Results     Date and Time Order Name Status Description    2017 1302 Anaerobic bacterial culture In process     2017 1301 Wound Culture Aerobic Bacterial Preliminary             Admission Information     Date & Time Provider Department Dept. Phone    2017 Lex Blake MD [place] [address] [facility] [phone]      Your Vitals Were     Blood Pressure Pulse Temperature Respirations Weight Pulse Oximetry    149/86 (BP Location: Left arm) 72 97.8  F (36.6  C) (Oral) 16 130 kg (286 lb 9.6 oz) 95%    BMI (Body Mass Index)                   40.54 kg/m2           MyChart Information     [employer] lets you send messages to your doctor, view your test results, renew your prescriptions, schedule appointments and more. To sign up, go to [email or web address] . Click on \"Log in\" on the left side of the screen, which will take you to the Welcome page. Then click on \"Sign up Now\" on the right side of the page.     You will be asked to enter the access code listed below, as well as some personal information. Please follow the directions to create your username and password.     Your access code is: [email or web address]  Expires: 10/29/2017 11:16 AM     Your access code will  in 90 days. If you need help or a new code, please call your [facility] clinic or [phone].        Care EveryWhere ID     This is your Care EveryWhere ID. This could be used by other organizations to access your [facility] medical records  [id number]        Equal Access to Services     [facility]: Hadii aad linda hadasho Sodimaali, waaxda luqadaha, qaybta kaalmada samiregyada, sunita wolf. So [facility] [phone].    ATENCIÓN: Si habla español, tiene a ventura disposición servicios gratuitos de asistencia lingüística. Llame al [phone].    We comply with applicable federal civil rights laws and [place] laws. We do not discriminate on " the basis of race, color, national origin, age, disability sex, sexual orientation or gender identity.               Review of your medicines      CONTINUE these medicines which may have CHANGED, or have new prescriptions. If we are uncertain of the size of tablets/capsules you have at home, strength may be listed as something that might have changed.        Dose / Directions    * oxyCODONE 5 MG IR tablet   Commonly known as:  ROXICODONE   This may have changed:  Another medication with the same name was added. Make sure you understand how and when to take each.   Used for:  S/P discectomy   Notes to Patient:  Next available at 2PM        Dose:  5 mg   Take 1 tablet (5 mg) by mouth every 4 hours as needed for pain   Quantity:  40 tablet   Refills:  0       * oxyCODONE 5 MG IR tablet   Commonly known as:  ROXICODONE   This may have changed:  You were already taking a medication with the same name, and this prescription was added. Make sure you understand how and when to take each.   Used for:  Status post lumbar discectomy        Dose:  5-10 mg   Take 1-2 tablets (5-10 mg) by mouth every 4 hours as needed for moderate to severe pain   Quantity:  20 tablet   Refills:  0       * Notice:  This list has 2 medication(s) that are the same as other medications prescribed for you. Read the directions carefully, and ask your doctor or other care provider to review them with you.      CONTINUE these medicines which have NOT CHANGED        Dose / Directions    PREDNISONE PO        Take 60 mg daily x 3 days, 40 mg daily x 3 days, then 30 mg daily x 3 days, then 20 mg daily x 3 days, then 10 mg daily x 3 days   Refills:  0            Where to get your medicines      Some of these will need a paper prescription and others can be bought over the counter. Ask your nurse if you have questions.     Bring a paper prescription for each of these medications     oxyCODONE 5 MG IR tablet                Protect others around you: Learn how  to safely use, store and throw away your medicines at www.disposemymeds.org.             Medication List: This is a list of all your medications and when to take them. Check marks below indicate your daily home schedule. Keep this list as a reference.      Medications           Morning Afternoon Evening Bedtime As Needed    * oxyCODONE 5 MG IR tablet   Commonly known as:  ROXICODONE   Take 1 tablet (5 mg) by mouth every 4 hours as needed for pain   Last time this was given:  5 mg on 8/22/2017 10:01 AM   Notes to Patient:  Next available at 2PM                                   * oxyCODONE 5 MG IR tablet   Commonly known as:  ROXICODONE   Take 1-2 tablets (5-10 mg) by mouth every 4 hours as needed for moderate to severe pain   Last time this was given:  5 mg on 8/22/2017 10:01 AM                                PREDNISONE PO   Take 60 mg daily x 3 days, 40 mg daily x 3 days, then 30 mg daily x 3 days, then 20 mg daily x 3 days, then 10 mg daily x 3 days                                * Notice:  This list has 2 medication(s) that are the same as other medications prescribed for you. Read the directions carefully, and ask your doctor or other care provider to review them with you.              More Information        Patient Education    Oxycodone Hydrochloride Oral capsule    Oxycodone Hydrochloride Oral solution    Oxycodone Hydrochloride Oral tablet    Oxycodone Hydrochloride Oral tablet [Abuse Deterrent]    Oxycodone Hydrochloride Oral tablet, extended-release  Oxycodone Hydrochloride Oral tablet  What is this medicine?  OXYCODONE (ox i KOE done) is a pain reliever. It is used to treat moderate to severe pain.  This medicine may be used for other purposes; ask your health care provider or pharmacist if you have questions.  What should I tell my health care provider before I take this medicine?  They need to know if you have any of these conditions:    Parthenon's disease    brain tumor    drug abuse or  addiction    head injury    heart disease    if you frequently drink alcohol containing drinks    kidney disease or problems going to the bathroom    liver disease    lung disease, asthma, or breathing problems    mental problems    an unusual or allergic reaction to oxycodone, codeine, hydrocodone, morphine, other medicines, foods, dyes, or preservatives    pregnant or trying to get pregnant    breast-feeding  How should I use this medicine?  Take this medicine by mouth with a glass of water. Follow the directions on the prescription label. You can take it with or without food. If it upsets your stomach, take it with food. Take your medicine at regular intervals. Do not take it more often than directed. Do not stop taking except on your doctor's advice.  Some brands of this medicine, like Oxecta, have special instructions. Ask your doctor or pharmacist if these directions are for you: Do not cut, crush or chew this medicine. Swallow only one tablet at a time. Do not wet, soak, or lick the tablet before you take it.  Talk to your pediatrician regarding the use of this medicine in children. Special care may be needed.  Overdosage: If you think you have taken too much of this medicine contact a poison control center or emergency room at once.  NOTE: This medicine is only for you. Do not share this medicine with others.  What if I miss a dose?  If you miss a dose, take it as soon as you can. If it is almost time for your next dose, take only that dose. Do not take double or extra doses.  What may interact with this medicine?    alcohol    antihistamines    certain medicines used for nausea like chlorpromazine, droperidol    erythromycin    ketoconazole    medicines for depression, anxiety, or psychotic disturbances    medicines for sleep    muscle relaxants    naloxone    naltrexone    narcotic medicines (opiates) for pain    nilotinib    phenobarbital    phenytoin    rifampin    ritonavir    voriconazole  This list  may not describe all possible interactions. Give your health care provider a list of all the medicines, herbs, non-prescription drugs, or dietary supplements you use. Also tell them if you smoke, drink alcohol, or use illegal drugs. Some items may interact with your medicine.  What should I watch for while using this medicine?  Tell your doctor or health care professional if your pain does not go away, if it gets worse, or if you have new or a different type of pain. You may develop tolerance to the medicine. Tolerance means that you will need a higher dose of the medicine for pain relief. Tolerance is normal and is expected if you take this medicine for a long time.  Do not suddenly stop taking your medicine because you may develop a severe reaction. Your body becomes used to the medicine. This does NOT mean you are addicted. Addiction is a behavior related to getting and using a drug for a non-medical reason. If you have pain, you have a medical reason to take pain medicine. Your doctor will tell you how much medicine to take. If your doctor wants you to stop the medicine, the dose will be slowly lowered over time to avoid any side effects.  You may get drowsy or dizzy when you first start taking this medicine or change doses. Do not drive, use machinery, or do anything that may be dangerous until you know how the medicine affects you. Stand or sit up slowly.  There are different types of narcotic medicines (opiates) for pain. If you take more than one type at the same time, you may have more side effects. Give your health care provider a list of all medicines you use. Your doctor will tell you how much medicine to take. Do not take more medicine than directed. Call emergency for help if you have problems breathing.  This medicine will cause constipation. Try to have a bowel movement at least every 2 to 3 days. If you do not have a bowel movement for 3 days, call your doctor or health care professional.  Your  mouth may get dry. Drinking water, chewing sugarless gum, or sucking on hard candy may help. See your dentist every 6 months.  What side effects may I notice from receiving this medicine?  Side effects that you should report to your doctor or health care professional as soon as possible:    allergic reactions like skin rash, itching or hives, swelling of the face, lips, or tongue    breathing problems    confusion    feeling faint or lightheaded, falls    trouble passing urine or change in the amount of urine    unusually weak or tired  Side effects that usually do not require medical attention (report to your doctor or health care professional if they continue or are bothersome):    constipation    dry mouth    itching    nausea, vomiting    upset stomach  This list may not describe all possible side effects. Call your doctor for medical advice about side effects. You may report side effects to FDA at 2-334-FDA-8394.  Where should I keep my medicine?  Keep out of the reach of children. This medicine can be abused. Keep your medicine in a safe place to protect it from theft. Do not share this medicine with anyone. Selling or giving away this medicine is dangerous and against the law.  Store at room temperature between 15 and 30 degrees C (59 and 86 degrees F). Protect from light. Keep container tightly closed.  This medicine may cause accidental overdose and death if it is taken by other adults, children, or pets. Flush any unused medicine down the toilet to reduce the chance of harm. Do not use the medicine after the expiration date.  NOTE: This sheet is a summary. It may not cover all possible information. If you have questions about this medicine, talk to your doctor, pharmacist, or health care provider.  NOTE:This sheet is a summary. It may not cover all possible information. If you have questions about this medicine, talk to your doctor, pharmacist, or health care provider. Copyright  2016 [employer]

## 2017-08-20 NOTE — IP AVS SNAPSHOT
[address] [facility]    [address]    [place] [address]    Phone:  [phone]                                       After Visit Summary   8/20/2017    Fran Hernandez    MRN: [id number]           After Visit Summary Signature Page     I have received my discharge instructions, and my questions have been answered. I have discussed any challenges I see with this plan with the nurse or doctor.    ..........................................................................................................................................  Patient/Patient Representative Signature      ..........................................................................................................................................  Patient Representative Print Name and Relationship to Patient    ..................................................               ................................................  Date                                            Time    ..........................................................................................................................................  Reviewed by Signature/Title    ...................................................              ..............................................  Date                                                            Time

## 2017-08-21 ENCOUNTER — ANESTHESIA (OUTPATIENT)
Dept: SURGERY | Facility: CLINIC | Age: 22
DRG: 520 | End: 2017-08-21
Payer: COMMERCIAL

## 2017-08-21 ENCOUNTER — ANESTHESIA EVENT (OUTPATIENT)
Dept: SURGERY | Facility: CLINIC | Age: 22
DRG: 520 | End: 2017-08-21
Payer: COMMERCIAL

## 2017-08-21 PROBLEM — M51.26 RECURRENT HERNIATION OF LUMBAR DISC: Status: ACTIVE | Noted: 2017-08-21

## 2017-08-21 LAB
BACTERIA SPEC CULT: NORMAL
HGB BLD-MCNC: 16.8 G/DL (ref 13.3–17.7)
SPECIMEN SOURCE: NORMAL

## 2017-08-21 PROCEDURE — 36000065 ZZH SURGERY LEVEL 4 W FLUORO 1ST 30 MIN: Performed by: ORTHOPAEDIC SURGERY

## 2017-08-21 PROCEDURE — 87070 CULTURE OTHR SPECIMN AEROBIC: CPT | Performed by: ORTHOPAEDIC SURGERY

## 2017-08-21 PROCEDURE — 36415 COLL VENOUS BLD VENIPUNCTURE: CPT | Performed by: ANESTHESIOLOGY

## 2017-08-21 PROCEDURE — 0SB20ZZ EXCISION OF LUMBAR VERTEBRAL DISC, OPEN APPROACH: ICD-10-PCS | Performed by: ORTHOPAEDIC SURGERY

## 2017-08-21 PROCEDURE — 27210995 ZZH RX 272: Performed by: ORTHOPAEDIC SURGERY

## 2017-08-21 PROCEDURE — 85018 HEMOGLOBIN: CPT | Performed by: ANESTHESIOLOGY

## 2017-08-21 PROCEDURE — 25000128 H RX IP 250 OP 636: Performed by: ORTHOPAEDIC SURGERY

## 2017-08-21 PROCEDURE — 25000128 H RX IP 250 OP 636: Performed by: NURSE ANESTHETIST, CERTIFIED REGISTERED

## 2017-08-21 PROCEDURE — 87075 CULTR BACTERIA EXCEPT BLOOD: CPT | Performed by: ORTHOPAEDIC SURGERY

## 2017-08-21 PROCEDURE — 37000009 ZZH ANESTHESIA TECHNICAL FEE, EACH ADDTL 15 MIN: Performed by: ORTHOPAEDIC SURGERY

## 2017-08-21 PROCEDURE — 25000125 ZZHC RX 250: Performed by: ORTHOPAEDIC SURGERY

## 2017-08-21 PROCEDURE — 96376 TX/PRO/DX INJ SAME DRUG ADON: CPT

## 2017-08-21 PROCEDURE — G0378 HOSPITAL OBSERVATION PER HR: HCPCS

## 2017-08-21 PROCEDURE — 25000125 ZZHC RX 250: Performed by: NURSE ANESTHETIST, CERTIFIED REGISTERED

## 2017-08-21 PROCEDURE — 27210794 ZZH OR GENERAL SUPPLY STERILE: Performed by: ORTHOPAEDIC SURGERY

## 2017-08-21 PROCEDURE — 25000132 ZZH RX MED GY IP 250 OP 250 PS 637: Performed by: ORTHOPAEDIC SURGERY

## 2017-08-21 PROCEDURE — 71000012 ZZH RECOVERY PHASE 1 LEVEL 1 FIRST HR: Performed by: ORTHOPAEDIC SURGERY

## 2017-08-21 PROCEDURE — 40000170 ZZH STATISTIC PRE-PROCEDURE ASSESSMENT II: Performed by: ORTHOPAEDIC SURGERY

## 2017-08-21 PROCEDURE — 37000008 ZZH ANESTHESIA TECHNICAL FEE, 1ST 30 MIN: Performed by: ORTHOPAEDIC SURGERY

## 2017-08-21 PROCEDURE — 96374 THER/PROPH/DIAG INJ IV PUSH: CPT

## 2017-08-21 PROCEDURE — 25000128 H RX IP 250 OP 636: Performed by: ANESTHESIOLOGY

## 2017-08-21 PROCEDURE — 36000063 ZZH SURGERY LEVEL 4 EA 15 ADDTL MIN: Performed by: ORTHOPAEDIC SURGERY

## 2017-08-21 PROCEDURE — 25000566 ZZH SEVOFLURANE, EA 15 MIN: Performed by: ORTHOPAEDIC SURGERY

## 2017-08-21 PROCEDURE — 12000000 ZZH R&B MED SURG/OB

## 2017-08-21 RX ORDER — SODIUM CHLORIDE 9 MG/ML
INJECTION, SOLUTION INTRAVENOUS CONTINUOUS
Status: DISCONTINUED | OUTPATIENT
Start: 2017-08-21 | End: 2017-08-22 | Stop reason: HOSPADM

## 2017-08-21 RX ORDER — FENTANYL CITRATE 50 UG/ML
25-50 INJECTION, SOLUTION INTRAMUSCULAR; INTRAVENOUS
Status: DISCONTINUED | OUTPATIENT
Start: 2017-08-21 | End: 2017-08-21 | Stop reason: HOSPADM

## 2017-08-21 RX ORDER — SODIUM CHLORIDE, SODIUM LACTATE, POTASSIUM CHLORIDE, CALCIUM CHLORIDE 600; 310; 30; 20 MG/100ML; MG/100ML; MG/100ML; MG/100ML
INJECTION, SOLUTION INTRAVENOUS CONTINUOUS
Status: DISCONTINUED | OUTPATIENT
Start: 2017-08-21 | End: 2017-08-21 | Stop reason: HOSPADM

## 2017-08-21 RX ORDER — LIDOCAINE HYDROCHLORIDE 20 MG/ML
INJECTION, SOLUTION INFILTRATION; PERINEURAL PRN
Status: DISCONTINUED | OUTPATIENT
Start: 2017-08-21 | End: 2017-08-21

## 2017-08-21 RX ORDER — ONDANSETRON 2 MG/ML
4 INJECTION INTRAMUSCULAR; INTRAVENOUS EVERY 30 MIN PRN
Status: DISCONTINUED | OUTPATIENT
Start: 2017-08-21 | End: 2017-08-21 | Stop reason: HOSPADM

## 2017-08-21 RX ORDER — LIDOCAINE 40 MG/G
CREAM TOPICAL
Status: DISCONTINUED | OUTPATIENT
Start: 2017-08-21 | End: 2017-08-22 | Stop reason: HOSPADM

## 2017-08-21 RX ORDER — NALOXONE HYDROCHLORIDE 0.4 MG/ML
.1-.4 INJECTION, SOLUTION INTRAMUSCULAR; INTRAVENOUS; SUBCUTANEOUS
Status: DISCONTINUED | OUTPATIENT
Start: 2017-08-21 | End: 2017-08-22 | Stop reason: HOSPADM

## 2017-08-21 RX ORDER — BETAMETHASONE SODIUM PHOSPHATE AND BETAMETHASONE ACETATE 3; 3 MG/ML; MG/ML
INJECTION, SUSPENSION INTRA-ARTICULAR; INTRALESIONAL; INTRAMUSCULAR; SOFT TISSUE PRN
Status: DISCONTINUED | OUTPATIENT
Start: 2017-08-21 | End: 2017-08-21 | Stop reason: HOSPADM

## 2017-08-21 RX ORDER — ONDANSETRON 4 MG/1
4 TABLET, ORALLY DISINTEGRATING ORAL EVERY 30 MIN PRN
Status: DISCONTINUED | OUTPATIENT
Start: 2017-08-21 | End: 2017-08-21 | Stop reason: HOSPADM

## 2017-08-21 RX ORDER — PROPOFOL 10 MG/ML
INJECTION, EMULSION INTRAVENOUS CONTINUOUS PRN
Status: DISCONTINUED | OUTPATIENT
Start: 2017-08-21 | End: 2017-08-21

## 2017-08-21 RX ORDER — HYDROMORPHONE HYDROCHLORIDE 1 MG/ML
.3-.5 INJECTION, SOLUTION INTRAMUSCULAR; INTRAVENOUS; SUBCUTANEOUS
Status: DISCONTINUED | OUTPATIENT
Start: 2017-08-21 | End: 2017-08-22 | Stop reason: HOSPADM

## 2017-08-21 RX ORDER — OXYCODONE HYDROCHLORIDE 5 MG/1
5-10 TABLET ORAL
Status: DISCONTINUED | OUTPATIENT
Start: 2017-08-21 | End: 2017-08-22 | Stop reason: HOSPADM

## 2017-08-21 RX ORDER — ACETAMINOPHEN 325 MG/1
650 TABLET ORAL EVERY 4 HOURS PRN
Status: DISCONTINUED | OUTPATIENT
Start: 2017-08-24 | End: 2017-08-22 | Stop reason: HOSPADM

## 2017-08-21 RX ORDER — FENTANYL CITRATE 50 UG/ML
INJECTION, SOLUTION INTRAMUSCULAR; INTRAVENOUS PRN
Status: DISCONTINUED | OUTPATIENT
Start: 2017-08-21 | End: 2017-08-21

## 2017-08-21 RX ORDER — HYDROMORPHONE HYDROCHLORIDE 1 MG/ML
.3-.5 INJECTION, SOLUTION INTRAMUSCULAR; INTRAVENOUS; SUBCUTANEOUS EVERY 5 MIN PRN
Status: DISCONTINUED | OUTPATIENT
Start: 2017-08-21 | End: 2017-08-21 | Stop reason: HOSPADM

## 2017-08-21 RX ORDER — KETOROLAC TROMETHAMINE 30 MG/ML
30 INJECTION, SOLUTION INTRAMUSCULAR; INTRAVENOUS EVERY 6 HOURS
Status: COMPLETED | OUTPATIENT
Start: 2017-08-21 | End: 2017-08-22

## 2017-08-21 RX ORDER — CEFAZOLIN SODIUM 2 G/100ML
2 INJECTION, SOLUTION INTRAVENOUS EVERY 8 HOURS
Status: COMPLETED | OUTPATIENT
Start: 2017-08-21 | End: 2017-08-22

## 2017-08-21 RX ORDER — ONDANSETRON 2 MG/ML
INJECTION INTRAMUSCULAR; INTRAVENOUS PRN
Status: DISCONTINUED | OUTPATIENT
Start: 2017-08-21 | End: 2017-08-21

## 2017-08-21 RX ORDER — GLYCOPYRROLATE 0.2 MG/ML
INJECTION, SOLUTION INTRAMUSCULAR; INTRAVENOUS PRN
Status: DISCONTINUED | OUTPATIENT
Start: 2017-08-21 | End: 2017-08-21

## 2017-08-21 RX ORDER — PROPOFOL 10 MG/ML
INJECTION, EMULSION INTRAVENOUS PRN
Status: DISCONTINUED | OUTPATIENT
Start: 2017-08-21 | End: 2017-08-21

## 2017-08-21 RX ORDER — NEOSTIGMINE METHYLSULFATE 1 MG/ML
VIAL (ML) INJECTION PRN
Status: DISCONTINUED | OUTPATIENT
Start: 2017-08-21 | End: 2017-08-21

## 2017-08-21 RX ORDER — ACETAMINOPHEN 325 MG/1
975 TABLET ORAL EVERY 8 HOURS
Status: DISCONTINUED | OUTPATIENT
Start: 2017-08-21 | End: 2017-08-22 | Stop reason: HOSPADM

## 2017-08-21 RX ADMIN — MIDAZOLAM HYDROCHLORIDE 2 MG: 1 INJECTION, SOLUTION INTRAMUSCULAR; INTRAVENOUS at 12:25

## 2017-08-21 RX ADMIN — CEFAZOLIN SODIUM 2 G: 2 INJECTION, SOLUTION INTRAVENOUS at 19:55

## 2017-08-21 RX ADMIN — SODIUM CHLORIDE, POTASSIUM CHLORIDE, SODIUM LACTATE AND CALCIUM CHLORIDE: 600; 310; 30; 20 INJECTION, SOLUTION INTRAVENOUS at 10:55

## 2017-08-21 RX ADMIN — DIAZEPAM 5 MG: 5 TABLET ORAL at 14:53

## 2017-08-21 RX ADMIN — HYDROMORPHONE HYDROCHLORIDE 0.5 MG: 1 INJECTION, SOLUTION INTRAMUSCULAR; INTRAVENOUS; SUBCUTANEOUS at 19:50

## 2017-08-21 RX ADMIN — FENTANYL CITRATE 150 MCG: 50 INJECTION, SOLUTION INTRAMUSCULAR; INTRAVENOUS at 12:40

## 2017-08-21 RX ADMIN — PROPOFOL 200 MG: 10 INJECTION, EMULSION INTRAVENOUS at 12:40

## 2017-08-21 RX ADMIN — DIAZEPAM 5 MG: 5 TABLET ORAL at 02:51

## 2017-08-21 RX ADMIN — KETOROLAC TROMETHAMINE 30 MG: 30 INJECTION, SOLUTION INTRAMUSCULAR at 21:17

## 2017-08-21 RX ADMIN — ONDANSETRON 4 MG: 2 INJECTION INTRAMUSCULAR; INTRAVENOUS at 13:38

## 2017-08-21 RX ADMIN — KETOROLAC TROMETHAMINE 30 MG: 30 INJECTION, SOLUTION INTRAMUSCULAR at 14:57

## 2017-08-21 RX ADMIN — FENTANYL CITRATE 50 MCG: 50 INJECTION, SOLUTION INTRAMUSCULAR; INTRAVENOUS at 13:15

## 2017-08-21 RX ADMIN — GLYCOPYRROLATE 0.8 MG: 0.2 INJECTION, SOLUTION INTRAMUSCULAR; INTRAVENOUS at 14:12

## 2017-08-21 RX ADMIN — CEFAZOLIN SODIUM 3 G: 1 SOLUTION INTRAVENOUS at 12:46

## 2017-08-21 RX ADMIN — ROCURONIUM BROMIDE 10 MG: 10 INJECTION INTRAVENOUS at 13:16

## 2017-08-21 RX ADMIN — ROCURONIUM BROMIDE 50 MG: 10 INJECTION INTRAVENOUS at 12:40

## 2017-08-21 RX ADMIN — NEOSTIGMINE METHYLSULFATE 5 MG: 1 INJECTION INTRAMUSCULAR; INTRAVENOUS; SUBCUTANEOUS at 14:12

## 2017-08-21 RX ADMIN — HYDROMORPHONE HYDROCHLORIDE 0.3 MG: 1 INJECTION, SOLUTION INTRAMUSCULAR; INTRAVENOUS; SUBCUTANEOUS at 00:15

## 2017-08-21 RX ADMIN — HYDROMORPHONE HYDROCHLORIDE 0.5 MG: 1 INJECTION, SOLUTION INTRAMUSCULAR; INTRAVENOUS; SUBCUTANEOUS at 04:58

## 2017-08-21 RX ADMIN — MIDAZOLAM HYDROCHLORIDE 1 MG: 1 INJECTION, SOLUTION INTRAMUSCULAR; INTRAVENOUS at 11:05

## 2017-08-21 RX ADMIN — HYDROMORPHONE HYDROCHLORIDE 0.5 MG: 1 INJECTION, SOLUTION INTRAMUSCULAR; INTRAVENOUS; SUBCUTANEOUS at 08:31

## 2017-08-21 RX ADMIN — SODIUM CHLORIDE: 9 INJECTION, SOLUTION INTRAVENOUS at 15:58

## 2017-08-21 RX ADMIN — HYDROMORPHONE HYDROCHLORIDE 0.5 MG: 1 INJECTION, SOLUTION INTRAMUSCULAR; INTRAVENOUS; SUBCUTANEOUS at 02:50

## 2017-08-21 RX ADMIN — PROPOFOL 50 MCG/KG/MIN: 10 INJECTION, EMULSION INTRAVENOUS at 12:51

## 2017-08-21 RX ADMIN — LIDOCAINE HYDROCHLORIDE 80 MG: 20 INJECTION, SOLUTION INFILTRATION; PERINEURAL at 12:40

## 2017-08-21 RX ADMIN — FENTANYL CITRATE 50 MCG: 50 INJECTION, SOLUTION INTRAMUSCULAR; INTRAVENOUS at 13:19

## 2017-08-21 ASSESSMENT — ACTIVITIES OF DAILY LIVING (ADL)
AMBULATION: 0-->INDEPENDENT
FALL_HISTORY_WITHIN_LAST_SIX_MONTHS: NO
RETIRED_COMMUNICATION: 0-->UNDERSTANDS/COMMUNICATES WITHOUT DIFFICULTY
COGNITION: 0 - NO COGNITION ISSUES REPORTED
TOILETING: 0-->INDEPENDENT
SWALLOWING: 0-->SWALLOWS FOODS/LIQUIDS WITHOUT DIFFICULTY
RETIRED_EATING: 0-->INDEPENDENT
TRANSFERRING: 0-->INDEPENDENT
DRESS: 0-->INDEPENDENT
BATHING: 0-->INDEPENDENT

## 2017-08-21 ASSESSMENT — COPD QUESTIONNAIRES: COPD: 0

## 2017-08-21 NOTE — PLAN OF CARE
Problem: Goal Outcome Summary  Goal: Goal Outcome Summary  Outcome: No Change  A&Ox4, anxious. CMS intact except numbness in LLE. VSS. NPO after midnight. Bedrest. Given PRN dilaudid for pain radiating in lower back which was effective. PRN valium for anxiety given. New dressing applied to incision on back. Recent MRI showed fluid collection at pt's previous surgical site. Plan for surgery with Hayden today.

## 2017-08-21 NOTE — PHARMACY-ADMISSION MEDICATION HISTORY
Admission medication history interview status for the 8/20/2017  admission is complete. See EPIC admission navigator for prior to admission medications     Medication history source reliability:Good    Actions taken by pharmacist (provider contacted, etc):Discussed PTA med list with patient.      Additional medication history information not noted on PTA med list : Patient had sertraline 50 mg on PTA med list from H&P from in July, but patient states he is no longer taking sertraline.     Medication reconciliation/reorder completed by provider prior to medication history? No    Time spent in this activity: 15 minutes     Prior to Admission medications    Medication Sig Last Dose Taking? Auth Provider   PREDNISONE PO Take 60 mg daily x 3 days, 40 mg daily x 3 days, then 30 mg daily x 3 days, then 20 mg daily x 3 days, then 10 mg daily x 3 days 8/20/2017 at Unknown time Yes Unknown, Entered By History   oxyCODONE (ROXICODONE) 5 MG IR tablet Take 1 tablet (5 mg) by mouth every 4 hours as needed for pain 8/20/2017 at am Yes Karen Price, KWAME

## 2017-08-21 NOTE — OP NOTE
DATE OF PROCEDURE:  08/21/2017      PREOPERATIVE DIAGNOSIS:  Recurrent left L4-5 disk herniation with new weakness and sensory deficit in L5 distribution.      POSTOPERATIVE DIAGNOSIS:  Recurrent left L4-L5 disk herniation with new weakness and sensory deficit in L5 distribution.      PROCEDURE:  Revision left L4-5 discectomy.      SURGEON:  Lex Blake MD      ASSISTANT:   Anne Martinez RN      ANESTHESIA:  General.      INDICATIONS FOR SURGERY:  Fran Hernandez is 22 years old.  He underwent surgery 3 weeks ago for a left L4-5 disk herniation.  He did well for 3 weeks.  I saw him last Friday.  Friday morning he woke up from a nap and had severe recurrence of left leg pain.  He was noted to have new weakness in his EHL and had some decreased sensation in his leg and markedly positive straight leg raising test.  An MRI scan was ordered.  It was scheduled for tomorrow.  On Sunday, he could not take care of himself.  He was unable to walk.  The pain was severe, he went by ambulance to [facility] and was then transferred here.  An MRI scan showed a recurrent disc herniation.      OPERATIVE DESCRIPTION:  The patient was brought to the operating room.  General anesthetic was administered by the Anesthesiology staff.  The patient was positioned prone on the Blevins frame and carefully padded and positioned and his back was prepped and draped in routine sterile fashion.  After the timeout, the previous skin incision was incised.  Sutures were removed.  He had a little fluid at the base of the subcutaneous layer.  It did not look infected, but I did culture it.  The wound was irrigated out.  All the previous sutures were removed.  The fascia was then opened.  The fascial sutures were removed and the subfascial layer was irrigated out.  I then placed a Walton retractor, exposing the left side at L4-5 and the previous laminotomy site.  The laminotomy site was easily visible.  This layer was also irrigated out  with antibiotic solution.      The Gelfoam was removed from over the previous laminotomy site.  There was some scarring that had taken place.  This had to be released from the edge of the laminotomy site.  I was then able to identify the lateral edge of the traversing L5 nerve root.  This was mobilized.  My assistant was then able to hold the nerve retracted with a D'Errico retractor.  Using a forward angled curet I reached under the edge of the nerve root and then disc material extruded under pressure.  This was grabbed with a pituitary and removed in 1 moderate-sized free fragment.  I was then able to mobilize the dural sac over more.  I could see the hole where the piece had extruded from.  I entered the hole in the annulus with straight and upbiting Cali pituitaries.  I retrieved one more additional loose fragment.  At this point, the wound was irrigated with antibiotic solution.  I could not retrieve any additional fragments.  Marcaine was used in the skin, fascia and muscle.  Final irrigation was performed.  I inspected the disc one more time.  Connected any additional fragments.  Bone wax was placed over the bleeding cancellous surfaces.  The fascia was then closed with interrupted #1 Vicryl suture.  Subq was closed with 2-0 Vicryl with a Hemovac drain in the subcu, and 3-0 Vicryl was used for the skin.  Dressings were applied, drapes removed.  He was transferred to the hospital cart and taken to the recovery room in good condition.  Estimated blood loss was 20 mL.      COMPLICATIONS:  None.      DRAINS:  One Hemovac.         VERONICA HODGE MD             D: 2017 14:46   T: 2017 16:50   MT: [id number]      Name:     RAFAL LOYA   MRN:      [id number]        Account:        [id number]   :      1995           Procedure Date: 2017      Document: [id number]

## 2017-08-21 NOTE — ANESTHESIA POSTPROCEDURE EVALUATION
Patient: Fran David    Procedure(s):  LEFT LUMBAR FOUR TO FIVE DISCETOMY REVISION - Wound Class: I-Clean    Diagnosis:DISC HERNIATION  Diagnosis Additional Information: No value filed.    Anesthesia Type:  General, ETT    Note:  Anesthesia Post Evaluation    Patient location during evaluation: PACU  Patient participation: Able to fully participate in evaluation  Level of consciousness: awake, awake and alert and responsive to verbal stimuli  Pain management: adequate  Airway patency: patent  Cardiovascular status: acceptable  Respiratory status: acceptable  Hydration status: acceptable  PONV: none     Anesthetic complications: None          Last vitals:  Vitals:    08/21/17 1509 08/21/17 1510 08/21/17 1531   BP:  160/80 (!) 143/91   Pulse:      Resp:  16 16   Temp: 36.6  C (97.8  F)     SpO2: 98%  99%         Electronically Signed By: Claudia Swift  August 21, 2017  3:47 PM

## 2017-08-21 NOTE — UTILIZATION REVIEW
[facility]   Admission Status; Secondary Review Determination     Under the authority of the Utilization Management Committee, the utilization review process indicated a secondary review on the above patient. The review outcome is based on review of the medical records, discussions with staff, and applying clinical experience noted on the date of the review.     (x) Outpatient Status with extended recovery is appropriate - This patient does not meet hospital inpatient criteria.    RATIONALE FOR DETERMINATION   22 year old male with a history of previous L5-S1 discectomy a few years ago and recent L4-5 discectomy July 31 presents with recurrent left leg radicular pain associated with calf numbness and weakness that failed outpatient steroid therapy.  Patient underwent semiurgent revision of left L4-5 discectomy secondary to recurrent L4 5 disc herniation with new neurologic deficit.  No documented complications or unexpected recovery. The procedure is considered an outpatient procedure under normal circumstances. Patient can be safely  monitored for bleeding and recover in outpatient/extended recovery setting.   The severity of illness, intensity of service provided, expected LOS and risk for adverse outcome doesn't meet inpatient hospital admission.   A message was sent to Dr. Blake via his clinic staff, but I did not receive a call back to discuss further.     The information on this document is developed by the utilization review team in order for the business office to ensure compliance. This only denotes the appropriateness of proper admission status and does not reflect the quality of care rendered.   The definitions of Inpatient Status and Observation Status used in making the determination above are those provided in the CMS Coverage Manual, Chapter 1 and Chapter 6, section 70.4.   Sincerely,   Lexx Burkett MD   Utilization Management   [facility].

## 2017-08-21 NOTE — H&P
[facility]    History and Physical  Spine Surgery     Date of Admission:  8/20/2017  Date of Service (when I saw the patient): 08/21/17    Assessment & Plan      Recurrent left L4 5 disc herniation with left leg radiculopathy including new sensory and motor deficit.      Code Status   Full Code    Primary Care Physician   None    Chief Complaint      Recurrent left leg pain    History of Present Illness     Rogelio is 22 years old.  Rogelio has a history of previous L5-S1 discectomy a few years ago and most recently on July 31, 2017 he had a left L4 5 discectomy.  He had Immediate relief of his preoperative left leg pain.  He did not have any numbness tingling or weakness associated with that surgery or disc herniation.    Since his surgery three weeks ago he was doing very well with good relief of leg pain, minimal back pain, not requiring any significant pain medication.  Last Thursday three days prior to admission he went to the [employer] walking around.  Just for a short period of time maybe 15-20 minutes.  He went home.  Went to bed that night without any difficulty.  On Friday morning he got up early and went for a 10 minute drive.  Came back home and laid back down again, and when he got up had severe onset of left leg pain.  He was scheduled to see me in the office on Friday for a postop check.  When he came into the office she was in a wheelchair due to severe left leg pain.  He was noted to have some decreased sensation in his left calf and weakness of his left extensor hallux longus muscle.  I ordered a new MRI scan.  Unfortunately wasn't scheduled to be done until Tuesday (tomorrow).  On Sunday morning he was unable to do anything at home.  He tried to give things time.  He was given a Medrol Dosepak.  He eventually ended up in the emergency room at [facility] being taken by ambulance.  I talked to the emergency room and on-call physician.  He was transferred to service  of the hospital for semiurgent surgery today.    He's complaining of mild back pain, strong left buttock and posterior thigh pain and strong pain down at his ankle.  He has numbness and tingling in the lateral calf and the top of this foot lateral border of his foot.  He is unable to straighten his leg out into markedly positive tension sign.  Likewise he can't straighten out the right leg completely either.    He has not had any fevers, chills, sweats, no problems with his incision.  No drainage.      Past Medical History    I have reviewed this patient's medical history and updated it with pertinent information if needed.   Past Medical History:   Diagnosis Date     Anxiety      Depression      Lumbar radiculopathy      Obese      Other chronic pain     lower back       Past Surgical History   I have reviewed this patient's surgical history and updated it with pertinent information if needed.  Past Surgical History:   Procedure Laterality Date     BACK SURGERY       LAMINECTOMY LUMBAR ONE LEVEL N/A 7/31/2017    Procedure: LAMINECTOMY LUMBAR ONE LEVEL;  LEFT L4-5 DISCECTOMY;  Surgeon: Lex Blake MD;  Location: [facility] OR     LUMBAR BACK SURGERY         Prior to Admission Medications   Prior to Admission Medications   Prescriptions Last Dose Informant Patient Reported? Taking?   PREDNISONE PO 8/20/2017 at Unknown time Self Yes Yes   Sig: Take 60 mg daily x 3 days, 40 mg daily x 3 days, then 30 mg daily x 3 days, then 20 mg daily x 3 days, then 10 mg daily x 3 days   oxyCODONE (ROXICODONE) 5 MG IR tablet 8/20/2017 at am Self No Yes   Sig: Take 1 tablet (5 mg) by mouth every 4 hours as needed for pain      Facility-Administered Medications: None     Allergies   No Known Allergies    Social History   I have reviewed this patient's social history and updated it with pertinent information if needed. Fran Hernandez  reports that he has never smoked. He has never used smokeless tobacco. He reports that he drinks  alcohol. He reports that he does not use illicit drugs.    Family History   I have reviewed this patient's family history and updated it with pertinent information if needed.   No family history on file.    Review of Systems   The 10 point Review of Systems is negative other than noted in the HPI or here.     Physical Exam   Temp: 97.8  F (36.6  C) Temp src: Oral BP: 134/77 Pulse: 72   Resp: 16 SpO2: 97 % O2 Device: None (Room air)    Vital Signs with Ranges  Temp:  [97.5  F (36.4  C)-98.1  F (36.7  C)] 97.8  F (36.6  C)  Pulse:  [] 72  Resp:  [16] 16  BP: (127-134)/(71-77) 134/77  SpO2:  [94 %-97 %] 97 %  286 lbs 9.57 oz    Constitutional: Alert, Oriented, Cooperative    Respiratory: Lungs Clear  Cardiovascular: RRR  GI: Abdmen, S,NT,ND  Skin: Dry, Nonerythematous  Neurologic: Normal Sensory, Normal Motor  Musculoskeletal: He has markedly positive tension signs.  He is unable to straighten either the right or left leg out without significant increase in pain in the left leg.  He has decreased light touch sensation is left lateral calf lateral border of his left foot and top of his left foot.  Otherwise normal sensation everywhere else.  Motor strength shows left EHL 4/5, all other muscle groups are 5/5 bilaterally  Reflexes two at the knees zero to ankles bilaterally    Data   No results found for this or any previous visit (from the past 24 hour(s)).   MRI scan at [facility] yesterday with contrast showed recurrent disc herniation on the left at L4-5.  Small amount of fluid in the previous laminotomy site.  No evidence of infection or recurrent herniations at any other levels.

## 2017-08-21 NOTE — BRIEF OP NOTE
[facility]  Spinal Surgery Brief Operative Note    Pre-operative diagnosis: RECURRENT LEFT L4-5 DISC HERNIATION WITH NEW WEAKNESS AND SENSORY DEFICIT   Post-operative diagnosis: Same   Procedure: REVISION LEFT L45 DISCECTOMY   Surgeon: VERONICA HODGE MD   Assistant(s): JACOBY FIGUEROA PA-C   Anesthesia: General endotracheal anesthesia   Estimated blood loss: 20 ml   Total IV fluids: (See anesthesia record)   Blood transfusion: NONE   Drains: Hemovac   Specimens: NONE   Implants: AS ABOVE   Findings: AS ABOVE   Complications: NONE   Condition: STABLE   Comments: SEE DICTATED OPERATIVE REPORT FOR DETAILS

## 2017-08-21 NOTE — ANESTHESIA CARE TRANSFER NOTE
Patient: Fran David    Procedure(s):  LEFT LUMBAR FOUR TO FIVE DISCETOMY REVISION - Wound Class: I-Clean    Diagnosis: DISC HERNIATION  Diagnosis Additional Information: No value filed.    Anesthesia Type:   General, ETT     Note:  Airway :Face Mask  Patient transferred to:PACU  Comments: Neuromuscular blockade reversed after TOF 4/4, spontaneous respirations, adequate tidal volumes, followed commands to voice, oropharynx suctioned with soft flexible catheter, extubated atraumatically, extubated with suction, airway patent after extubation.  Oxygen via facemask at 6 liters per minute to PACU. Oxygen tubing connected to wall O2 in PACU, SpO2, NiBP, and EKG monitors and alarms on and functioning, Jason Hugger warmer connected to patient gown, report on patient's clinical status given to PACU RN, RN questions answered.       Vitals: (Last set prior to Anesthesia Care Transfer)    CRNA VITALS  8/21/2017 1346 - 8/21/2017 1425      8/21/2017             Resp Rate (set): 10                Electronically Signed By: SAMMY Dolan CRNA  August 21, 2017  2:25 PM

## 2017-08-22 VITALS
WEIGHT: 286.6 LBS | OXYGEN SATURATION: 95 % | TEMPERATURE: 97.8 F | RESPIRATION RATE: 16 BRPM | HEART RATE: 72 BPM | DIASTOLIC BLOOD PRESSURE: 86 MMHG | BODY MASS INDEX: 40.54 KG/M2 | SYSTOLIC BLOOD PRESSURE: 149 MMHG

## 2017-08-22 LAB — GLUCOSE BLDC GLUCOMTR-MCNC: 110 MG/DL (ref 70–99)

## 2017-08-22 PROCEDURE — 40000893 ZZH STATISTIC PT IP EVAL DEFER: Performed by: PHYSICAL THERAPIST

## 2017-08-22 PROCEDURE — 25000128 H RX IP 250 OP 636: Performed by: ORTHOPAEDIC SURGERY

## 2017-08-22 PROCEDURE — 25000132 ZZH RX MED GY IP 250 OP 250 PS 637: Performed by: ORTHOPAEDIC SURGERY

## 2017-08-22 PROCEDURE — 00000146 ZZHCL STATISTIC GLUCOSE BY METER IP

## 2017-08-22 RX ORDER — OXYCODONE HYDROCHLORIDE 5 MG/1
5-10 TABLET ORAL EVERY 4 HOURS PRN
Qty: 20 TABLET | Refills: 0 | Status: SHIPPED | OUTPATIENT
Start: 2017-08-22

## 2017-08-22 RX ADMIN — CEFAZOLIN SODIUM 2 G: 2 INJECTION, SOLUTION INTRAVENOUS at 03:36

## 2017-08-22 RX ADMIN — OXYCODONE HYDROCHLORIDE 5 MG: 5 TABLET ORAL at 10:01

## 2017-08-22 RX ADMIN — KETOROLAC TROMETHAMINE 30 MG: 30 INJECTION, SOLUTION INTRAMUSCULAR at 03:36

## 2017-08-22 RX ADMIN — OXYCODONE HYDROCHLORIDE 5 MG: 5 TABLET ORAL at 12:23

## 2017-08-22 RX ADMIN — ACETAMINOPHEN 975 MG: 325 TABLET, FILM COATED ORAL at 06:09

## 2017-08-22 RX ADMIN — OXYCODONE HYDROCHLORIDE 5 MG: 5 TABLET ORAL at 06:53

## 2017-08-22 RX ADMIN — HYDROMORPHONE HYDROCHLORIDE 0.3 MG: 1 INJECTION, SOLUTION INTRAMUSCULAR; INTRAVENOUS; SUBCUTANEOUS at 00:42

## 2017-08-22 NOTE — PLAN OF CARE
Problem: Goal Outcome Summary  Goal: Goal Outcome Summary  Outcome: Improving  PO day 0 L4-L5 lami revision. Arrived from PACU at 1530. Alert and oriented x4. VSS. CMS with waxing/waning numbness and burning sensation on lateral aspect of left calf and dorsal foot. Pt reports that the numbness has decreased markedly since what he was experiencing before surgery. Pre-op leg pain resolved. Dressing on back CDI. Hemovac patent with 0 o/p. Due to void. Stood at side of bed. Tolerating clear liquids.

## 2017-08-22 NOTE — DISCHARGE INSTRUCTIONS
Care after a Discectomy/Decompression - Dr. Lex Blake    The following information will help you through your recovery at home.  Pain    It is normal for you to experience some pain in the area of your incision after your surgery.  Some of your leg pain may go away immediately after your surgery.  Some of the pain will gradually decrease over the next few days or weeks depending on the amount of inflammation present in the nerve.      Sometimes Dr. Blake will place a steroid preparation on the nerve during surgery.   This may help decrease the nerve inflammation for the first few days after surgery.  If some of your leg pain returns 3 to 5 days after surgery it may be due to the steroid wearing off.  The pain should not be as bad as your pre-op pain and will diminish over a period of weeks.      You should call Dr. Blake s office if your leg pain returns suddenly and does not improve over 24 hours.    Activity    You may increase your activity as tolerated; walking is the best form of exercise after spine surgery.      Avoid bending, lifting, and twisting (BLT).  Avoid activities such as vacuuming, raking and shoveling.    Try to limit your lifting to 10-15lbs during the first 2 weeks and then increase to 20-25lbs over the next 6 weeks.    You may return to work approximately 1- 2 weeks after your surgery, if you have a sedentary or desk type job.  If you have a physical job Dr. Blake and his staff will help you determine a return to work plan.      You may resume sexual activity when you feel ready.  Stop if you have pain.    Driving    You may drive if you feel strong enough and are not taking any narcotic pain medications.    Incision Site     The first 3 days after surgery Dr. Blake would like you to keep your incision dry.   You may take a sponge bath during this time or cover your dressing with a transparent material called Tegaderm (sold at most pharmacies).      The first 3 days after surgery you  should change your gauze dressing at least once a day.  After 3 days you may remove the gauze dressing and leave it off, at this point you may shower without covering your incision, allow water and soap to run over your incision but do not scrub the area or soak in a tub.    Your incision is covered with steri-strips (narrow white tapes); they will get loose and fall of in 7-10 days.  If they do not fall off after 10 days you may remove them or Dr. Hayden pérez staff will remove them at your post-op visit.    Most patients have dissolvable stitches which do not need to be removed.  In some cases nylon stitches are used and they need to be removed, 10-14 days after surgery.  If you have staples or nylon sutures please call for a removal appointment with Dr. Hayden pérez nurse.    Pain Management     Take your prescribed pain medication as needed and directed.  Use Tylenol and Ibuprofen for your discomfort when you no longer need the narcotic pain medication.      If you need a refill on your pain medication call [phone], please allow 24 hours for your prescription to be refilled, Dr. Hayden pérez office does not refill pain medications on Friday.   Diet     Eat a healthy diet; this will help your recovery.    Drink plenty of fluids, water, milk or juice.    If you have trouble with constipation you should eat more fiber, drink more fluids, increase your walking or try an over the counter laxative.    Follow-up Visits    You should have a post-op appointment that was scheduled for you at the same time your surgery was scheduled. If you do not, please call Dr. Hayden pérez office when you get home from your surgery.    Write down any questions you have about your surgery, recovery, return to work and other topics you wished to be covered at your post-op visit.  This way, we will be able to address all of your questions at your next visit.    Call Dr. Hayden pérez office if you have any questions or concerns.    When to Call your  Doctor:    If you have any redness, warmth or swelling at the incision site.    If your incision opens up.    If you have increasing drainage from your incision.    If you have a temperature greater than 100.5 degrees Fahrenheit.    [address] [place] [id number]  Phone: [phone]  Fax: [phone]  Web site: [email or web address]    Follow up with Dr. Blake in 2-3 weeks.

## 2017-08-22 NOTE — PLAN OF CARE
Problem: Goal Outcome Summary  Goal: Goal Outcome Summary  PT: PT orders received, PT screen completed. Pt is a 21yo male POD#1 s/p revision of left L4-5 diskectomy d/t recurrent left L4-5 disc herniation with sensory and motor deficit. First left L4-5 diskectomy 7/31/17. Pt plans to stay with his parents in their home with 3 steps to enter, all needs met on main level with tub/shower and grab bars. Pt's parents/family will be providing 24hr assist for pt at discharge.      Pt able to demonstrate bed mobility via log roll, sit<>stand transfers, gait 300' and ascending/descending 3 stairs all with SBA. Reviewed spinal precautions and provided educational handout, discussed walking program. Pt demonstrates understanding of spinal precautions. Pt also expresses interest in OP PT pending recovery, advised pt to discuss with Dr. Blake at f/u appointment. No further PT needs identified. OT screen completed, no OT needs identified. Anticipate safe discharge home with parents assist as needed. Orders completed.

## 2017-08-22 NOTE — PLAN OF CARE
Problem: Goal Outcome Summary  Goal: Goal Outcome Summary  Outcome: Adequate for Discharge Date Met:  08/22/17  A&Ox4. L lateral calf and foot numbness. Bowel sounds hypo, educated on post op bowel management-pt and mother familiar with this from past two back surgeries. Up with SBA. Ambulated halls with nursing and PT prior to d/c. Tolerating regular diet. VSS. Hemovac d/c'd, new dressing CDI. Incision pain decreased with oxycodone and valium. Plan to d/c home with assist from parents. D/c meds, instructions, f/u reviewed with pt and parents prior to d/c. Oxycodone script sent with pt.

## 2017-08-22 NOTE — PLAN OF CARE
A&O. Neuros intact with slight numbness/burning to lateral side of left foot, improving. VSS. Clear liquid diet, mild nausea with movement. Up with 1 assist. Minimal pain controlled with IV dilaudid.  Voiding well with minimal PVR. Dressing CDI, HVC with no output. Plan pending OT/PT assessment, pain control, oral intake.

## 2017-08-22 NOTE — DISCHARGE SUMMARY
[facility]    Discharge Summary  Spine Surgery    Date of Admission:  8/20/2017  Date of Discharge:  8/22/2017  Discharging Provider: Lex Blake  Date of Service (when I saw the patient): 08/22/17    Primary Diagnosis This Admission:  Recurrent left L4 5 disc herniation with sensory and motor deficit      Additional Diagnosis This Admission:  None  Principal Procedure This Admission:  Revision left L4 5 discectomy  Additional Procedures:  None  Hospital Course:  The patient was admitted to the hospital on 8/20/17.  He was transferred from [facility] where he was being seen in the emergency room.  He was a direct admit to the seventh floor.  He was having severe left leg pain.  He had surgery three weeks before.  Was doing well until two days before admission when he developed recurrent left leg pain.  MRI scan was performed at [facility].  Showed a recurrent disc herniation.    He underwent surgery the morning after admission and had complete relief of his left leg pain postoperatively.  He is very heavy young man.  5 feet 10 inches tall 280 pounds.  We did put a drain in postoperatively because of some oozing.  A drain did not put any significant amount out.  The drain was removed this morning.    He is voiding, ambulating, taking p.o.  And is ready to go home.  He will follow up with me in 2-3 weeks.  His examination this morning shows that he still has some slight decreased light touch sensation in his left lateral calf and foot and his EHL strength is still 4/5.    Pain Medication on Discharge: Oxycodone on discharge.     Antibiotics prescribed at discharge: None prescribed     Lex Blake    Discharge Disposition   Discharged to home   Condition at discharge: Stable    Primary Care Physician   None    Consultations This Hospital Stay   OCCUPATIONAL THERAPY ADULT IP CONSULT  PHYSICAL THERAPY ADULT IP CONSULT    Time Spent on this Encounter   I have spent less than 30  minutes on this discharge.    Discharge Orders   No discharge procedures on file.  Discharge Medications   Current Discharge Medication List      CONTINUE these medications which have NOT CHANGED    Details   PREDNISONE PO Take 60 mg daily x 3 days, 40 mg daily x 3 days, then 30 mg daily x 3 days, then 20 mg daily x 3 days, then 10 mg daily x 3 days      oxyCODONE (ROXICODONE) 5 MG IR tablet Take 1 tablet (5 mg) by mouth every 4 hours as needed for pain  Qty: 40 tablet, Refills: 0    Associated Diagnoses: S/P discectomy           Allergies   No Known Allergies  Data   Most Recent 3 CBC's:  Recent Labs   Lab Test  08/21/17   0846   HGB  16.8      Most Recent 3 BMP's:No lab results found.  Most Recent 2 LFT's:No lab results found.  Most Recent INR's and Anticoagulation Dosing History:  Anticoagulation Dose History     There is no flowsheet data to display.        Most Recent 3 Troponin's:No lab results found.  Most Recent Cholesterol Panel:No lab results found.  Most Recent 6 Bacteria Isolates From Any Culture (See EPIC Reports for Culture Details):  Recent Labs   Lab Test  08/21/17   1301   CULT  Canceled, Test credited  Test reordered as correct code  REORDERED AS A WOUND CULTURE       Most Recent TSH, T4 and A1c Labs:No lab results found.

## 2017-08-23 LAB
BACTERIA SPEC CULT: NO GROWTH
Lab: NORMAL
SPECIMEN SOURCE: NORMAL

## 2017-08-28 LAB
BACTERIA SPEC CULT: NORMAL
Lab: NORMAL
SPECIMEN SOURCE: NORMAL
